# Patient Record
Sex: MALE | Race: WHITE | URBAN - METROPOLITAN AREA
[De-identification: names, ages, dates, MRNs, and addresses within clinical notes are randomized per-mention and may not be internally consistent; named-entity substitution may affect disease eponyms.]

---

## 2019-07-15 ENCOUNTER — HOSPITAL ENCOUNTER (OUTPATIENT)
Dept: CARDIOLOGY | Facility: HOSPITAL | Age: 64
Discharge: HOME | End: 2019-07-15
Attending: NURSE PRACTITIONER

## 2019-07-26 ENCOUNTER — OFFICE VISIT CONVERTED (OUTPATIENT)
Dept: GASTROENTEROLOGY | Facility: CLINIC | Age: 64
End: 2019-07-26
Attending: PHYSICIAN ASSISTANT

## 2019-09-26 ENCOUNTER — OFFICE VISIT CONVERTED (OUTPATIENT)
Dept: GASTROENTEROLOGY | Facility: CLINIC | Age: 64
End: 2019-09-26
Attending: PHYSICIAN ASSISTANT

## 2019-11-21 ENCOUNTER — HOSPITAL ENCOUNTER (OUTPATIENT)
Dept: GASTROENTEROLOGY | Facility: HOSPITAL | Age: 64
Setting detail: HOSPITAL OUTPATIENT SURGERY
Discharge: HOME | End: 2019-11-21
Attending: INTERNAL MEDICINE

## 2020-02-04 ENCOUNTER — OFFICE VISIT CONVERTED (OUTPATIENT)
Dept: GASTROENTEROLOGY | Facility: CLINIC | Age: 65
End: 2020-02-04
Attending: PHYSICIAN ASSISTANT

## 2020-02-04 ENCOUNTER — CONVERSION ENCOUNTER (OUTPATIENT)
Dept: GASTROENTEROLOGY | Facility: CLINIC | Age: 65
End: 2020-02-04

## 2021-05-15 VITALS
WEIGHT: 214 LBS | OXYGEN SATURATION: 98 % | SYSTOLIC BLOOD PRESSURE: 146 MMHG | BODY MASS INDEX: 29.96 KG/M2 | DIASTOLIC BLOOD PRESSURE: 63 MMHG | RESPIRATION RATE: 16 BRPM | HEART RATE: 69 BPM | HEIGHT: 71 IN

## 2021-05-15 VITALS
DIASTOLIC BLOOD PRESSURE: 71 MMHG | BODY MASS INDEX: 29.16 KG/M2 | OXYGEN SATURATION: 97 % | RESPIRATION RATE: 16 BRPM | HEART RATE: 75 BPM | SYSTOLIC BLOOD PRESSURE: 155 MMHG | HEIGHT: 73 IN | WEIGHT: 220 LBS

## 2021-05-15 VITALS
DIASTOLIC BLOOD PRESSURE: 60 MMHG | WEIGHT: 209 LBS | OXYGEN SATURATION: 100 % | HEART RATE: 79 BPM | SYSTOLIC BLOOD PRESSURE: 120 MMHG | RESPIRATION RATE: 16 BRPM

## 2023-04-11 ENCOUNTER — APPOINTMENT (OUTPATIENT)
Dept: CARDIOLOGY | Facility: HOSPITAL | Age: 68
DRG: 603 | End: 2023-04-11
Payer: MEDICARE

## 2023-04-11 ENCOUNTER — HOSPITAL ENCOUNTER (INPATIENT)
Facility: HOSPITAL | Age: 68
LOS: 3 days | Discharge: HOME | DRG: 603 | End: 2023-04-14
Attending: EMERGENCY MEDICINE | Admitting: INTERNAL MEDICINE
Payer: MEDICARE

## 2023-04-11 DIAGNOSIS — L03.116 CELLULITIS OF LEFT LOWER EXTREMITY: Primary | ICD-10-CM

## 2023-04-11 LAB
ALBUMIN SERPL-MCNC: 3.8 G/DL (ref 3.5–5.2)
ALBUMIN/GLOB SERPL: 1.1 G/DL
ALP SERPL-CCNC: 107 U/L (ref 39–117)
ALT SERPL W P-5'-P-CCNC: 32 U/L (ref 1–41)
ANION GAP SERPL CALCULATED.3IONS-SCNC: 12.7 MMOL/L (ref 5–15)
AST SERPL-CCNC: 29 U/L (ref 1–40)
BASOPHILS # BLD AUTO: 0.06 10*3/MM3 (ref 0–0.2)
BASOPHILS NFR BLD AUTO: 0.5 % (ref 0–1.5)
BH CV LOWER VASCULAR LEFT COMMON FEMORAL AUGMENT: NORMAL
BH CV LOWER VASCULAR LEFT COMMON FEMORAL COMPETENT: NORMAL
BH CV LOWER VASCULAR LEFT COMMON FEMORAL COMPRESS: NORMAL
BH CV LOWER VASCULAR LEFT COMMON FEMORAL PHASIC: NORMAL
BH CV LOWER VASCULAR LEFT COMMON FEMORAL SPONT: NORMAL
BH CV LOWER VASCULAR LEFT DISTAL FEMORAL COMPRESS: NORMAL
BH CV LOWER VASCULAR LEFT GASTRONEMIUS COMPRESS: NORMAL
BH CV LOWER VASCULAR LEFT GREATER SAPH AK COMPRESS: NORMAL
BH CV LOWER VASCULAR LEFT GREATER SAPH BK COMPRESS: NORMAL
BH CV LOWER VASCULAR LEFT LESSER SAPH COMPRESS: NORMAL
BH CV LOWER VASCULAR LEFT MID FEMORAL AUGMENT: NORMAL
BH CV LOWER VASCULAR LEFT MID FEMORAL COMPETENT: NORMAL
BH CV LOWER VASCULAR LEFT MID FEMORAL COMPRESS: NORMAL
BH CV LOWER VASCULAR LEFT MID FEMORAL PHASIC: NORMAL
BH CV LOWER VASCULAR LEFT MID FEMORAL SPONT: NORMAL
BH CV LOWER VASCULAR LEFT PERONEAL COMPRESS: NORMAL
BH CV LOWER VASCULAR LEFT POPLITEAL AUGMENT: NORMAL
BH CV LOWER VASCULAR LEFT POPLITEAL COMPETENT: NORMAL
BH CV LOWER VASCULAR LEFT POPLITEAL COMPRESS: NORMAL
BH CV LOWER VASCULAR LEFT POPLITEAL PHASIC: NORMAL
BH CV LOWER VASCULAR LEFT POPLITEAL SPONT: NORMAL
BH CV LOWER VASCULAR LEFT POSTERIOR TIBIAL COMPRESS: NORMAL
BH CV LOWER VASCULAR LEFT PROXIMAL FEMORAL COMPRESS: NORMAL
BH CV LOWER VASCULAR LEFT SAPHENOFEMORAL JUNCTION COMPRESS: NORMAL
BH CV LOWER VASCULAR RIGHT COMMON FEMORAL AUGMENT: NORMAL
BH CV LOWER VASCULAR RIGHT COMMON FEMORAL COMPETENT: NORMAL
BH CV LOWER VASCULAR RIGHT COMMON FEMORAL COMPRESS: NORMAL
BH CV LOWER VASCULAR RIGHT COMMON FEMORAL PHASIC: NORMAL
BH CV LOWER VASCULAR RIGHT COMMON FEMORAL SPONT: NORMAL
BH CV VAS PRELIMINARY FINDINGS SCRIPTING: 1
BILIRUB SERPL-MCNC: 0.7 MG/DL (ref 0–1.2)
BUN SERPL-MCNC: 15 MG/DL (ref 8–23)
BUN/CREAT SERPL: 17 (ref 7–25)
CALCIUM SPEC-SCNC: 9.7 MG/DL (ref 8.6–10.5)
CHLORIDE SERPL-SCNC: 101 MMOL/L (ref 98–107)
CO2 SERPL-SCNC: 24.3 MMOL/L (ref 22–29)
CREAT SERPL-MCNC: 0.88 MG/DL (ref 0.76–1.27)
D-LACTATE SERPL-SCNC: 1.5 MMOL/L (ref 0.5–2)
DEPRECATED RDW RBC AUTO: 42.2 FL (ref 37–54)
EGFRCR SERPLBLD CKD-EPI 2021: 93.7 ML/MIN/1.73
EOSINOPHIL # BLD AUTO: 0.1 10*3/MM3 (ref 0–0.4)
EOSINOPHIL NFR BLD AUTO: 0.9 % (ref 0.3–6.2)
ERYTHROCYTE [DISTWIDTH] IN BLOOD BY AUTOMATED COUNT: 12.7 % (ref 12.3–15.4)
GLOBULIN UR ELPH-MCNC: 3.6 GM/DL
GLUCOSE SERPL-MCNC: 128 MG/DL (ref 65–99)
HCT VFR BLD AUTO: 39.5 % (ref 37.5–51)
HGB BLD-MCNC: 13.6 G/DL (ref 13–17.7)
IMM GRANULOCYTES # BLD AUTO: 0.19 10*3/MM3 (ref 0–0.05)
IMM GRANULOCYTES NFR BLD AUTO: 1.7 % (ref 0–0.5)
LYMPHOCYTES # BLD AUTO: 2.23 10*3/MM3 (ref 0.7–3.1)
LYMPHOCYTES NFR BLD AUTO: 19.9 % (ref 19.6–45.3)
MAXIMAL PREDICTED HEART RATE: 152 BPM
MCH RBC QN AUTO: 31.1 PG (ref 26.6–33)
MCHC RBC AUTO-ENTMCNC: 34.4 G/DL (ref 31.5–35.7)
MCV RBC AUTO: 90.4 FL (ref 79–97)
MONOCYTES # BLD AUTO: 0.82 10*3/MM3 (ref 0.1–0.9)
MONOCYTES NFR BLD AUTO: 7.3 % (ref 5–12)
NEUTROPHILS NFR BLD AUTO: 69.7 % (ref 42.7–76)
NEUTROPHILS NFR BLD AUTO: 7.83 10*3/MM3 (ref 1.7–7)
NRBC BLD AUTO-RTO: 0 /100 WBC (ref 0–0.2)
PLATELET # BLD AUTO: 165 10*3/MM3 (ref 140–450)
PMV BLD AUTO: 12.3 FL (ref 6–12)
POTASSIUM SERPL-SCNC: 3.7 MMOL/L (ref 3.5–5.2)
PROT SERPL-MCNC: 7.4 G/DL (ref 6–8.5)
RBC # BLD AUTO: 4.37 10*6/MM3 (ref 4.14–5.8)
SODIUM SERPL-SCNC: 138 MMOL/L (ref 136–145)
STRESS TARGET HR: 129 BPM
WBC NRBC COR # BLD: 11.23 10*3/MM3 (ref 3.4–10.8)

## 2023-04-11 PROCEDURE — 25010000002 FUROSEMIDE PER 20 MG: Performed by: INTERNAL MEDICINE

## 2023-04-11 PROCEDURE — 93971 EXTREMITY STUDY: CPT

## 2023-04-11 PROCEDURE — 80053 COMPREHEN METABOLIC PANEL: CPT | Performed by: EMERGENCY MEDICINE

## 2023-04-11 PROCEDURE — 25010000002 ENOXAPARIN PER 10 MG: Performed by: INTERNAL MEDICINE

## 2023-04-11 PROCEDURE — 25010000002 VANCOMYCIN 5 G RECONSTITUTED SOLUTION: Performed by: EMERGENCY MEDICINE

## 2023-04-11 PROCEDURE — 87040 BLOOD CULTURE FOR BACTERIA: CPT | Performed by: EMERGENCY MEDICINE

## 2023-04-11 PROCEDURE — 85025 COMPLETE CBC W/AUTO DIFF WBC: CPT | Performed by: EMERGENCY MEDICINE

## 2023-04-11 PROCEDURE — 25010000002 CEFAZOLIN IN DEXTROSE 2-4 GM/100ML-% SOLUTION: Performed by: INTERNAL MEDICINE

## 2023-04-11 PROCEDURE — 93971 EXTREMITY STUDY: CPT | Performed by: SURGERY

## 2023-04-11 PROCEDURE — 83605 ASSAY OF LACTIC ACID: CPT | Performed by: EMERGENCY MEDICINE

## 2023-04-11 PROCEDURE — 99284 EMERGENCY DEPT VISIT MOD MDM: CPT

## 2023-04-11 RX ORDER — TAMSULOSIN HYDROCHLORIDE 0.4 MG/1
1 CAPSULE ORAL DAILY
COMMUNITY
Start: 2023-02-27

## 2023-04-11 RX ORDER — ASPIRIN 81 MG/1
81 TABLET ORAL DAILY
COMMUNITY

## 2023-04-11 RX ORDER — VANCOMYCIN 2 GRAM/500 ML IN 0.9 % SODIUM CHLORIDE INTRAVENOUS
20 ONCE
Status: COMPLETED | OUTPATIENT
Start: 2023-04-11 | End: 2023-04-11

## 2023-04-11 RX ORDER — AMLODIPINE BESYLATE 10 MG/1
10 TABLET ORAL DAILY
COMMUNITY
End: 2023-04-14 | Stop reason: HOSPADM

## 2023-04-11 RX ORDER — ACETAMINOPHEN 325 MG/1
650 TABLET ORAL EVERY 4 HOURS PRN
Status: DISCONTINUED | OUTPATIENT
Start: 2023-04-11 | End: 2023-04-14 | Stop reason: HOSPADM

## 2023-04-11 RX ORDER — FUROSEMIDE 10 MG/ML
20 INJECTION INTRAMUSCULAR; INTRAVENOUS ONCE
Status: COMPLETED | OUTPATIENT
Start: 2023-04-11 | End: 2023-04-11

## 2023-04-11 RX ORDER — SODIUM CHLORIDE 0.9 % (FLUSH) 0.9 %
10 SYRINGE (ML) INJECTION EVERY 12 HOURS SCHEDULED
Status: DISCONTINUED | OUTPATIENT
Start: 2023-04-11 | End: 2023-04-14 | Stop reason: HOSPADM

## 2023-04-11 RX ORDER — ATORVASTATIN CALCIUM 20 MG/1
20 TABLET, FILM COATED ORAL NIGHTLY
COMMUNITY

## 2023-04-11 RX ORDER — DOXYCYCLINE 100 MG/1
TABLET ORAL
COMMUNITY
Start: 2023-04-10 | End: 2023-04-11

## 2023-04-11 RX ORDER — TELMISARTAN 80 MG/1
80 TABLET ORAL DAILY
COMMUNITY
Start: 2023-02-27

## 2023-04-11 RX ORDER — GABAPENTIN 600 MG/1
600 TABLET ORAL NIGHTLY
COMMUNITY

## 2023-04-11 RX ORDER — AMITRIPTYLINE HYDROCHLORIDE 25 MG/1
TABLET, FILM COATED ORAL
COMMUNITY
End: 2023-04-11

## 2023-04-11 RX ORDER — ONDANSETRON 4 MG/1
4 TABLET, FILM COATED ORAL EVERY 6 HOURS PRN
Status: DISCONTINUED | OUTPATIENT
Start: 2023-04-11 | End: 2023-04-14 | Stop reason: HOSPADM

## 2023-04-11 RX ORDER — ENOXAPARIN SODIUM 100 MG/ML
40 INJECTION SUBCUTANEOUS DAILY
Status: DISCONTINUED | OUTPATIENT
Start: 2023-04-11 | End: 2023-04-14 | Stop reason: HOSPADM

## 2023-04-11 RX ORDER — SODIUM CHLORIDE 9 MG/ML
40 INJECTION, SOLUTION INTRAVENOUS AS NEEDED
Status: DISCONTINUED | OUTPATIENT
Start: 2023-04-11 | End: 2023-04-14 | Stop reason: HOSPADM

## 2023-04-11 RX ORDER — CEFAZOLIN SODIUM 2 G/100ML
2 INJECTION, SOLUTION INTRAVENOUS EVERY 8 HOURS
Status: DISCONTINUED | OUTPATIENT
Start: 2023-04-11 | End: 2023-04-14 | Stop reason: HOSPADM

## 2023-04-11 RX ORDER — OMEPRAZOLE 40 MG/1
40 CAPSULE, DELAYED RELEASE ORAL DAILY
COMMUNITY

## 2023-04-11 RX ORDER — SODIUM CHLORIDE 0.9 % (FLUSH) 0.9 %
10 SYRINGE (ML) INJECTION AS NEEDED
Status: DISCONTINUED | OUTPATIENT
Start: 2023-04-11 | End: 2023-04-14 | Stop reason: HOSPADM

## 2023-04-11 RX ADMIN — FUROSEMIDE 20 MG: 10 INJECTION, SOLUTION INTRAMUSCULAR; INTRAVENOUS at 15:23

## 2023-04-11 RX ADMIN — ENOXAPARIN SODIUM 40 MG: 100 INJECTION SUBCUTANEOUS at 16:55

## 2023-04-11 RX ADMIN — VANCOMYCIN HYDROCHLORIDE 2000 MG: 5 INJECTION, POWDER, LYOPHILIZED, FOR SOLUTION INTRAVENOUS at 14:21

## 2023-04-11 RX ADMIN — CEFAZOLIN SODIUM 2 G: 2 INJECTION, SOLUTION INTRAVENOUS at 16:55

## 2023-04-11 NOTE — PLAN OF CARE
Goal Outcome Evaluation:   Redness noted to entire left lower extremity. No complaints per pt.

## 2023-04-11 NOTE — H&P
[facility] HISTORY AND PHYSICAL  Date: 2023   Patient Name: Ermias Fuentes  : 1955  MRN: [id number]  Primary Care Physician:  Talha Valle APRN  Date of admission: 2023    Subjective   Subjective     Chief Complaint: Left leg infection    HPI:    Ermias Fuentes is a 67 y.o. male past medical history BPH and hypertension who presents with left leg redness who failed outpatient therapy for cellulitis    Patient states that he had redness in his foot and leg for about 1 week.  Initially it was just in his foot and now it extends all the way up to his groin.  He had swelling lymph nodes in his groin.  No fevers or chills.  Saw his primary care doctor and was put on doxycycline.  He had no improvement and continued to streak up his leg.  As result he came to ER for further evaluation      In the emergency department the patient's vital signs are as follows: Temperature is 97.8, pulse 70, respirate 20, blood pressure 137/70, 98% on room air.  CBC shows a white blood cell count of 11.  CMP is normal.  Duplex does not show a DVT.  Patient was treated with doxycycline but this infection continued to spread so he came to the ER for further evaluation.  Blood cultures were sent.  Patient was given a dose of vancomycin.  Patient be admitted to hospital for cellulitis that failed outpatient treatment    All systems reviewed abnormal as noted above    Personal History     Past Medical History:  Hypertension  BPH    Past Surgical History:  Knee surgery    Family History:   No family history of skin effect    Social History:   Never smoked.   Social alcohol use    Home Medications:  amLODIPine, amitriptyline, aspirin, atorvastatin, doxycycline, omeprazole, and tamsulosin    Allergies:  No Known Allergies      Objective   Objective     Vitals:   Temp:  [97.8 °F (36.6 °C)] 97.8 °F (36.6 °C)  Heart Rate:  [78-84] 78  Resp:  [20] 20  BP: (137-180)/(70-73) 137/70    Physical  Exam    Constitutional: Awake, alert, no acute distress   Eyes: Pupils equal, sclerae anicteric, no conjunctival injection   HENT: NCAT, mucous membranes moist   Neck: Supple, no thyromegaly, no lymphadenopathy, trachea midline   Respiratory: Clear to auscultation bilaterally, nonlabored respirations    Cardiovascular: RRR, no murmurs, rubs, or gallops, palpable pedal pulses bilaterally   Gastrointestinal: Positive bowel sounds, soft, nontender, nondistended   Musculoskeletal: No bilateral ankle edema, no clubbing or cyanosis to extremities   Psychiatric: Appropriate affect, cooperative   Neurologic: Oriented x 3, strength symmetric in all extremities, Cranial Nerves grossly intact to confrontation, speech clear   Skin: 2+ pitting edema redness and warmth all the way up to the inguinal area.  Swollen lymph nodes    Result Review    Result Review:  I have personally reviewed the results from the time of this admission to 4/11/2023 14:17 EDT and agree with these findings:  White blood cell count is 11.3    Assessment & Plan   Assessment / Plan     Assessment/Plan:   Nonpurulent cellulitis that failed outpatient treatment  Hypertension  BPH    Plan:  -- Admit hospitalist service  -- Status post a dose of vancomycin in the emergency department will change to cefazolin because it appears to be nonpurulent cellulitis  -- 1 dose of Lasix due to swelling and leg  -- Plex was negative for DVT  -- Follow-up blood culture      DVT prophylaxis:  Lovenox    CODE STATUS:     Full code      Admission Status:  I believe this patient meets admission   status.    Electronically signed by Jose Shepherd MD, 04/11/23, 2:17 PM EDT.

## 2023-04-12 ENCOUNTER — TRANSCRIBE ORDERS (OUTPATIENT)
Dept: ADMINISTRATIVE | Facility: HOSPITAL | Age: 68
End: 2023-04-12
Payer: MEDICARE

## 2023-04-12 DIAGNOSIS — L03.116 CELLULITIS OF LEFT LOWER LIMB: Primary | ICD-10-CM

## 2023-04-12 LAB
ALBUMIN SERPL-MCNC: 3.4 G/DL (ref 3.5–5.2)
ALBUMIN/GLOB SERPL: 1.1 G/DL
ALP SERPL-CCNC: 143 U/L (ref 39–117)
ALT SERPL W P-5'-P-CCNC: 32 U/L (ref 1–41)
ANION GAP SERPL CALCULATED.3IONS-SCNC: 12.7 MMOL/L (ref 5–15)
AST SERPL-CCNC: 33 U/L (ref 1–40)
BASOPHILS # BLD AUTO: 0.04 10*3/MM3 (ref 0–0.2)
BASOPHILS NFR BLD AUTO: 0.4 % (ref 0–1.5)
BILIRUB SERPL-MCNC: 0.5 MG/DL (ref 0–1.2)
BUN SERPL-MCNC: 12 MG/DL (ref 8–23)
BUN/CREAT SERPL: 13 (ref 7–25)
CALCIUM SPEC-SCNC: 8.9 MG/DL (ref 8.6–10.5)
CHLORIDE SERPL-SCNC: 100 MMOL/L (ref 98–107)
CO2 SERPL-SCNC: 25.3 MMOL/L (ref 22–29)
CREAT SERPL-MCNC: 0.92 MG/DL (ref 0.76–1.27)
DEPRECATED RDW RBC AUTO: 42.1 FL (ref 37–54)
EGFRCR SERPLBLD CKD-EPI 2021: 90.6 ML/MIN/1.73
EOSINOPHIL # BLD AUTO: 0.14 10*3/MM3 (ref 0–0.4)
EOSINOPHIL NFR BLD AUTO: 1.5 % (ref 0.3–6.2)
ERYTHROCYTE [DISTWIDTH] IN BLOOD BY AUTOMATED COUNT: 12.8 % (ref 12.3–15.4)
GLOBULIN UR ELPH-MCNC: 3.2 GM/DL
GLUCOSE SERPL-MCNC: 105 MG/DL (ref 65–99)
HCT VFR BLD AUTO: 37.7 % (ref 37.5–51)
HGB BLD-MCNC: 13.1 G/DL (ref 13–17.7)
IMM GRANULOCYTES # BLD AUTO: 0.27 10*3/MM3 (ref 0–0.05)
IMM GRANULOCYTES NFR BLD AUTO: 2.8 % (ref 0–0.5)
LYMPHOCYTES # BLD AUTO: 1.9 10*3/MM3 (ref 0.7–3.1)
LYMPHOCYTES NFR BLD AUTO: 20 % (ref 19.6–45.3)
MAGNESIUM SERPL-MCNC: 1.5 MG/DL (ref 1.6–2.4)
MCH RBC QN AUTO: 31.6 PG (ref 26.6–33)
MCHC RBC AUTO-ENTMCNC: 34.7 G/DL (ref 31.5–35.7)
MCV RBC AUTO: 90.8 FL (ref 79–97)
MONOCYTES # BLD AUTO: 1.05 10*3/MM3 (ref 0.1–0.9)
MONOCYTES NFR BLD AUTO: 11.1 % (ref 5–12)
NEUTROPHILS NFR BLD AUTO: 6.09 10*3/MM3 (ref 1.7–7)
NEUTROPHILS NFR BLD AUTO: 64.2 % (ref 42.7–76)
NRBC BLD AUTO-RTO: 0 /100 WBC (ref 0–0.2)
PLATELET # BLD AUTO: 165 10*3/MM3 (ref 140–450)
PMV BLD AUTO: 12.2 FL (ref 6–12)
POTASSIUM SERPL-SCNC: 3.3 MMOL/L (ref 3.5–5.2)
PROT SERPL-MCNC: 6.6 G/DL (ref 6–8.5)
RBC # BLD AUTO: 4.15 10*6/MM3 (ref 4.14–5.8)
SODIUM SERPL-SCNC: 138 MMOL/L (ref 136–145)
WBC NRBC COR # BLD: 9.49 10*3/MM3 (ref 3.4–10.8)

## 2023-04-12 PROCEDURE — 36415 COLL VENOUS BLD VENIPUNCTURE: CPT | Performed by: INTERNAL MEDICINE

## 2023-04-12 PROCEDURE — 83735 ASSAY OF MAGNESIUM: CPT | Performed by: INTERNAL MEDICINE

## 2023-04-12 PROCEDURE — 25010000002 ENOXAPARIN PER 10 MG: Performed by: INTERNAL MEDICINE

## 2023-04-12 PROCEDURE — 25010000002 FUROSEMIDE PER 20 MG: Performed by: STUDENT IN AN ORGANIZED HEALTH CARE EDUCATION/TRAINING PROGRAM

## 2023-04-12 PROCEDURE — 25010000002 MAGNESIUM SULFATE IN D5W 1G/100ML (PREMIX) 1-5 GM/100ML-% SOLUTION: Performed by: STUDENT IN AN ORGANIZED HEALTH CARE EDUCATION/TRAINING PROGRAM

## 2023-04-12 PROCEDURE — 25010000002 CEFAZOLIN IN DEXTROSE 2-4 GM/100ML-% SOLUTION: Performed by: INTERNAL MEDICINE

## 2023-04-12 PROCEDURE — 80053 COMPREHEN METABOLIC PANEL: CPT | Performed by: INTERNAL MEDICINE

## 2023-04-12 PROCEDURE — 85025 COMPLETE CBC W/AUTO DIFF WBC: CPT | Performed by: INTERNAL MEDICINE

## 2023-04-12 RX ORDER — GABAPENTIN 300 MG/1
600 CAPSULE ORAL NIGHTLY
Status: DISCONTINUED | OUTPATIENT
Start: 2023-04-12 | End: 2023-04-14 | Stop reason: HOSPADM

## 2023-04-12 RX ORDER — OXYCODONE AND ACETAMINOPHEN 7.5; 325 MG/1; MG/1
1 TABLET ORAL EVERY 6 HOURS PRN
Status: DISCONTINUED | OUTPATIENT
Start: 2023-04-12 | End: 2023-04-14 | Stop reason: HOSPADM

## 2023-04-12 RX ORDER — POTASSIUM CHLORIDE 750 MG/1
40 CAPSULE, EXTENDED RELEASE ORAL ONCE
Status: COMPLETED | OUTPATIENT
Start: 2023-04-12 | End: 2023-04-12

## 2023-04-12 RX ORDER — ASPIRIN 81 MG/1
81 TABLET ORAL DAILY
Status: DISCONTINUED | OUTPATIENT
Start: 2023-04-12 | End: 2023-04-12

## 2023-04-12 RX ORDER — ATORVASTATIN CALCIUM 20 MG/1
20 TABLET, FILM COATED ORAL NIGHTLY
Status: DISCONTINUED | OUTPATIENT
Start: 2023-04-12 | End: 2023-04-14 | Stop reason: HOSPADM

## 2023-04-12 RX ORDER — PANTOPRAZOLE SODIUM 40 MG/1
40 TABLET, DELAYED RELEASE ORAL
Status: DISCONTINUED | OUTPATIENT
Start: 2023-04-12 | End: 2023-04-14 | Stop reason: HOSPADM

## 2023-04-12 RX ORDER — MAGNESIUM SULFATE 1 G/100ML
1 INJECTION INTRAVENOUS ONCE
Status: COMPLETED | OUTPATIENT
Start: 2023-04-12 | End: 2023-04-12

## 2023-04-12 RX ORDER — ASPIRIN 81 MG/1
81 TABLET ORAL NIGHTLY
Status: DISCONTINUED | OUTPATIENT
Start: 2023-04-12 | End: 2023-04-14 | Stop reason: HOSPADM

## 2023-04-12 RX ORDER — TAMSULOSIN HYDROCHLORIDE 0.4 MG/1
0.4 CAPSULE ORAL DAILY
Status: DISCONTINUED | OUTPATIENT
Start: 2023-04-12 | End: 2023-04-12

## 2023-04-12 RX ORDER — FUROSEMIDE 10 MG/ML
40 INJECTION INTRAMUSCULAR; INTRAVENOUS ONCE
Status: COMPLETED | OUTPATIENT
Start: 2023-04-12 | End: 2023-04-12

## 2023-04-12 RX ORDER — TAMSULOSIN HYDROCHLORIDE 0.4 MG/1
0.4 CAPSULE ORAL NIGHTLY
Status: DISCONTINUED | OUTPATIENT
Start: 2023-04-12 | End: 2023-04-14 | Stop reason: HOSPADM

## 2023-04-12 RX ADMIN — ACETAMINOPHEN 650 MG: 325 TABLET ORAL at 00:14

## 2023-04-12 RX ADMIN — FUROSEMIDE 40 MG: 10 INJECTION, SOLUTION INTRAMUSCULAR; INTRAVENOUS at 17:52

## 2023-04-12 RX ADMIN — CEFAZOLIN SODIUM 2 G: 2 INJECTION, SOLUTION INTRAVENOUS at 19:23

## 2023-04-12 RX ADMIN — ATORVASTATIN CALCIUM 20 MG: 20 TABLET, FILM COATED ORAL at 20:13

## 2023-04-12 RX ADMIN — ENOXAPARIN SODIUM 40 MG: 100 INJECTION SUBCUTANEOUS at 09:10

## 2023-04-12 RX ADMIN — MAGNESIUM SULFATE 1 G: 1 INJECTION INTRAVENOUS at 09:10

## 2023-04-12 RX ADMIN — ASPIRIN 81 MG: 81 TABLET, COATED ORAL at 20:13

## 2023-04-12 RX ADMIN — POTASSIUM CHLORIDE 40 MEQ: 10 CAPSULE, COATED, EXTENDED RELEASE ORAL at 09:10

## 2023-04-12 RX ADMIN — PANTOPRAZOLE SODIUM 40 MG: 40 TABLET, DELAYED RELEASE ORAL at 09:10

## 2023-04-12 RX ADMIN — CEFAZOLIN SODIUM 2 G: 2 INJECTION, SOLUTION INTRAVENOUS at 00:15

## 2023-04-12 RX ADMIN — CEFAZOLIN SODIUM 2 G: 2 INJECTION, SOLUTION INTRAVENOUS at 10:26

## 2023-04-12 RX ADMIN — Medication 10 ML: at 20:13

## 2023-04-12 RX ADMIN — ACETAMINOPHEN 650 MG: 325 TABLET ORAL at 10:27

## 2023-04-12 RX ADMIN — Medication 10 ML: at 09:11

## 2023-04-12 RX ADMIN — ACETAMINOPHEN 650 MG: 325 TABLET ORAL at 17:52

## 2023-04-12 RX ADMIN — Medication 10 ML: at 00:16

## 2023-04-12 RX ADMIN — TAMSULOSIN HYDROCHLORIDE 0.4 MG: 0.4 CAPSULE ORAL at 20:13

## 2023-04-12 RX ADMIN — GABAPENTIN 600 MG: 300 CAPSULE ORAL at 20:13

## 2023-04-12 NOTE — PLAN OF CARE
Goal Outcome Evaluation:  Plan of Care Reviewed With: patient        Progress: no change  Outcome Evaluation: Patient requests having home meds restarted. Reviewed home meds with patient--takes Micardis and Prilosec in AM, and Amlodipine, gabapentin, flomax, aspirin at bedtime.       Patient has had temp of 100.1 at start of shift, improved with tylenol. Patient reports having fever and flu-like symptoms off and on for past week or so, concurrent with appearance of rash on Left leg. Mild c/o pain in left leg.

## 2023-04-12 NOTE — SIGNIFICANT NOTE
Wound Eval / Progress Noted    [facility]     Patient Name: Ermias Fuentes  : 1955  MRN: [id number]  Today's Date: 2023                 Admit Date: 2023    Visit Dx:    ICD-10-CM ICD-9-CM   1. Cellulitis of left lower extremity  L03.116 682.6       Patient Active Problem List   Diagnosis   • Cellulitis of left lower extremity        Past Medical History:   Diagnosis Date   • BPH (benign prostatic hyperplasia)    • Hyperlipidemia    • Hypertension         Past Surgical History:   Procedure Laterality Date   • KNEE SURGERY Left      Wound Check / Follow-up:  Patient seen today for wound consult. Patient presents to hospital with redness, swelling, and warmth of his left leg. Subsequently diagnosed with cellulitis. No open wounds noted upon assessment. Erythema is marked with surgical skin marker. Redness extends from left ankle to left groin and is notably worse to left lower leg. Patient states this has happened before but not to this extent. No weeping or blistering is noted. Recommending to elevate extremity and provide skin care with CHG washes.      Impression: Cellulitis of left lower extremity.      Short term goals:  Maintain skin integrity, skin care.      Frances Zuñiga RN    2023    10:38 EDT

## 2023-04-12 NOTE — PROGRESS NOTES
" [facility]   Hospitalist Progress Note  Date: 2023  Patient Name: Ermias Fuentes  : 1955  MRN: [id number]  Date of admission: 2023      Subjective   Subjective     Chief Complaint: Left leg infection    Summary: 67-year-old male with history of BPH and hypertension who presented with worsening left lower extremity cellulitis.  Patient had been previously placed on doxycycline as outpatient however failed treatment.  Patient has been admitted for intravenous antibiotics and further management of his left lower extremity cellulitis.    Interval Followup: Patient still complaining of some left lower extremity discomfort.  He states that he would like pain medication, but does not want to take it because he is afraid of becoming \" sick to his stomach.\"  I discussed with the patient that he has nausea medication if needed.  He has not any fevers or chills.  Potassium was low this morning subsequently replenished.    Review of Systems  All systems reviewed and are negative except as above in HPI.    Objective   Objective     Vitals:   Temp:  [97.8 °F (36.6 °C)-100.1 °F (37.8 °C)] 98.3 °F (36.8 °C)  Heart Rate:  [76-96] 87  Resp:  [18-20] 18  BP: (119-180)/(42-73) 153/66  Physical Exam   Constitutional: Alert, awake, in distress due to left lower extremity pain  HEENT:  PERRLA, EOMI; No Scleral icterus  Neck:  Supple; No Mass, No Lymphadenopathy  Cardiovascular:  No Rubs, No Edema, No JVD  Respiratory: No respiratory distress, unlabored breathing, saturating well on room air  Abdomen:  Normal BS all 4 Quadrants; No Guarding, No Tenderness  Musculoskeletal:  Pulses Positive all 4 Ext; left lower extremity with 2+ edema, extensive erythema and edema, tender to palpation  Neurological:  Alert+Ox3, Mental status WNL; No Dysarthria    Result Review    Result Review:  I have personally reviewed the results from the time of this admission to 2023 07:26 EDT and agree with these findings:  []  " Laboratory  []  Microbiology  []  Radiology  []  EKG/Telemetry   []  Cardiology/Vascular   []  Pathology  []  Old records  []  Other:    Assessment & Plan   Assessment / Plan     Assessment:  Nonpurulent cellulitis of left lower extremity, failed outpatient treatment  Since hypertension  BPH  Hypokalemia   Hypomagnesemia    Plan:  Patient to be admitted and continuously monitored for left lower extremity cellulitis  Continue with cefazolin IV 2 g every 8 hours, await results of blood cultures  Patient complaining of pain, will add Percocet as needed  Will give dose of 40 mg Lasix IV for diuresis, monitor I's and O's, daily weights  Continue to monitor CMP and renal function while patient receiving diuretics as he is at risk for nephrotoxicity  Continue tamsulosin given history of BPH  Restart patient's home gabapentin  Continue DVT prophylaxis with Lovenox 40 mg daily  Replenish potassium and magnesium, continue to monitor  CBC, CMP, exam, phosphorus reviewed, recheck in follow-up in a.m.  Wound care assistance appreciated, continue to monitor  Zofran as needed for nausea  Further recommendations pending clinical course      Discussed plan with RN.    DVT prophylaxis:  Medical DVT prophylaxis orders are present.    CODE STATUS:   Level Of Support Discussed With: Patient  Code Status (Patient has no pulse and is not breathing): CPR (Attempt to Resuscitate)  Medical Interventions (Patient has pulse or is breathing): Full Support        Electronically signed by Logan Caicedo DO, 04/12/23, 7:26 AM EDT.

## 2023-04-13 LAB
ALBUMIN SERPL-MCNC: 3.4 G/DL (ref 3.5–5.2)
ALBUMIN/GLOB SERPL: 1 G/DL
ALP SERPL-CCNC: 111 U/L (ref 39–117)
ALT SERPL W P-5'-P-CCNC: 31 U/L (ref 1–41)
ANION GAP SERPL CALCULATED.3IONS-SCNC: 10.8 MMOL/L (ref 5–15)
AST SERPL-CCNC: 30 U/L (ref 1–40)
BASOPHILS # BLD AUTO: 0.05 10*3/MM3 (ref 0–0.2)
BASOPHILS NFR BLD AUTO: 0.5 % (ref 0–1.5)
BILIRUB SERPL-MCNC: 0.5 MG/DL (ref 0–1.2)
BUN SERPL-MCNC: 11 MG/DL (ref 8–23)
BUN/CREAT SERPL: 11.3 (ref 7–25)
CALCIUM SPEC-SCNC: 9.2 MG/DL (ref 8.6–10.5)
CHLORIDE SERPL-SCNC: 99 MMOL/L (ref 98–107)
CO2 SERPL-SCNC: 26.2 MMOL/L (ref 22–29)
CREAT SERPL-MCNC: 0.97 MG/DL (ref 0.76–1.27)
DEPRECATED RDW RBC AUTO: 42.1 FL (ref 37–54)
EGFRCR SERPLBLD CKD-EPI 2021: 85 ML/MIN/1.73
EOSINOPHIL # BLD AUTO: 0.17 10*3/MM3 (ref 0–0.4)
EOSINOPHIL NFR BLD AUTO: 1.7 % (ref 0.3–6.2)
ERYTHROCYTE [DISTWIDTH] IN BLOOD BY AUTOMATED COUNT: 12.7 % (ref 12.3–15.4)
GLOBULIN UR ELPH-MCNC: 3.3 GM/DL
GLUCOSE SERPL-MCNC: 119 MG/DL (ref 65–99)
HCT VFR BLD AUTO: 38.6 % (ref 37.5–51)
HGB BLD-MCNC: 13.1 G/DL (ref 13–17.7)
IMM GRANULOCYTES # BLD AUTO: 0.43 10*3/MM3 (ref 0–0.05)
IMM GRANULOCYTES NFR BLD AUTO: 4.3 % (ref 0–0.5)
LYMPHOCYTES # BLD AUTO: 2.25 10*3/MM3 (ref 0.7–3.1)
LYMPHOCYTES NFR BLD AUTO: 22.6 % (ref 19.6–45.3)
MAGNESIUM SERPL-MCNC: 1.8 MG/DL (ref 1.6–2.4)
MCH RBC QN AUTO: 30.6 PG (ref 26.6–33)
MCHC RBC AUTO-ENTMCNC: 33.9 G/DL (ref 31.5–35.7)
MCV RBC AUTO: 90.2 FL (ref 79–97)
MONOCYTES # BLD AUTO: 1.19 10*3/MM3 (ref 0.1–0.9)
MONOCYTES NFR BLD AUTO: 12 % (ref 5–12)
NEUTROPHILS NFR BLD AUTO: 5.85 10*3/MM3 (ref 1.7–7)
NEUTROPHILS NFR BLD AUTO: 58.9 % (ref 42.7–76)
NRBC BLD AUTO-RTO: 0 /100 WBC (ref 0–0.2)
PHOSPHATE SERPL-MCNC: 4 MG/DL (ref 2.5–4.5)
PLATELET # BLD AUTO: 176 10*3/MM3 (ref 140–450)
PMV BLD AUTO: 12 FL (ref 6–12)
POTASSIUM SERPL-SCNC: 3.4 MMOL/L (ref 3.5–5.2)
PROT SERPL-MCNC: 6.7 G/DL (ref 6–8.5)
RBC # BLD AUTO: 4.28 10*6/MM3 (ref 4.14–5.8)
SODIUM SERPL-SCNC: 136 MMOL/L (ref 136–145)
WBC NRBC COR # BLD: 9.94 10*3/MM3 (ref 3.4–10.8)

## 2023-04-13 PROCEDURE — 80053 COMPREHEN METABOLIC PANEL: CPT | Performed by: STUDENT IN AN ORGANIZED HEALTH CARE EDUCATION/TRAINING PROGRAM

## 2023-04-13 PROCEDURE — 25010000002 ENOXAPARIN PER 10 MG: Performed by: INTERNAL MEDICINE

## 2023-04-13 PROCEDURE — 25010000002 FUROSEMIDE PER 20 MG: Performed by: STUDENT IN AN ORGANIZED HEALTH CARE EDUCATION/TRAINING PROGRAM

## 2023-04-13 PROCEDURE — 84100 ASSAY OF PHOSPHORUS: CPT | Performed by: STUDENT IN AN ORGANIZED HEALTH CARE EDUCATION/TRAINING PROGRAM

## 2023-04-13 PROCEDURE — 83735 ASSAY OF MAGNESIUM: CPT | Performed by: STUDENT IN AN ORGANIZED HEALTH CARE EDUCATION/TRAINING PROGRAM

## 2023-04-13 PROCEDURE — 25010000002 CEFAZOLIN IN DEXTROSE 2-4 GM/100ML-% SOLUTION: Performed by: INTERNAL MEDICINE

## 2023-04-13 PROCEDURE — 85025 COMPLETE CBC W/AUTO DIFF WBC: CPT | Performed by: STUDENT IN AN ORGANIZED HEALTH CARE EDUCATION/TRAINING PROGRAM

## 2023-04-13 RX ORDER — GUAIFENESIN 600 MG/1
600 TABLET, EXTENDED RELEASE ORAL EVERY 12 HOURS SCHEDULED
Status: DISCONTINUED | OUTPATIENT
Start: 2023-04-13 | End: 2023-04-14 | Stop reason: HOSPADM

## 2023-04-13 RX ORDER — FUROSEMIDE 10 MG/ML
40 INJECTION INTRAMUSCULAR; INTRAVENOUS ONCE
Status: COMPLETED | OUTPATIENT
Start: 2023-04-13 | End: 2023-04-13

## 2023-04-13 RX ORDER — POTASSIUM CHLORIDE 750 MG/1
40 CAPSULE, EXTENDED RELEASE ORAL ONCE
Status: COMPLETED | OUTPATIENT
Start: 2023-04-13 | End: 2023-04-13

## 2023-04-13 RX ADMIN — CEFAZOLIN SODIUM 2 G: 2 INJECTION, SOLUTION INTRAVENOUS at 00:18

## 2023-04-13 RX ADMIN — Medication 10 ML: at 21:30

## 2023-04-13 RX ADMIN — POTASSIUM CHLORIDE 40 MEQ: 10 CAPSULE, COATED, EXTENDED RELEASE ORAL at 08:56

## 2023-04-13 RX ADMIN — ACETAMINOPHEN 650 MG: 325 TABLET ORAL at 16:07

## 2023-04-13 RX ADMIN — CEFAZOLIN SODIUM 2 G: 2 INJECTION, SOLUTION INTRAVENOUS at 08:38

## 2023-04-13 RX ADMIN — ACETAMINOPHEN 650 MG: 325 TABLET ORAL at 21:32

## 2023-04-13 RX ADMIN — PANTOPRAZOLE SODIUM 40 MG: 40 TABLET, DELAYED RELEASE ORAL at 05:01

## 2023-04-13 RX ADMIN — TAMSULOSIN HYDROCHLORIDE 0.4 MG: 0.4 CAPSULE ORAL at 21:29

## 2023-04-13 RX ADMIN — Medication 10 ML: at 08:38

## 2023-04-13 RX ADMIN — ASPIRIN 81 MG: 81 TABLET, COATED ORAL at 21:29

## 2023-04-13 RX ADMIN — ENOXAPARIN SODIUM 40 MG: 100 INJECTION SUBCUTANEOUS at 08:38

## 2023-04-13 RX ADMIN — CEFAZOLIN SODIUM 2 G: 2 INJECTION, SOLUTION INTRAVENOUS at 15:39

## 2023-04-13 RX ADMIN — ACETAMINOPHEN 650 MG: 325 TABLET ORAL at 08:56

## 2023-04-13 RX ADMIN — GABAPENTIN 600 MG: 300 CAPSULE ORAL at 21:29

## 2023-04-13 RX ADMIN — GUAIFENESIN 600 MG: 600 TABLET ORAL at 16:55

## 2023-04-13 RX ADMIN — ATORVASTATIN CALCIUM 20 MG: 20 TABLET, FILM COATED ORAL at 21:29

## 2023-04-13 RX ADMIN — FUROSEMIDE 40 MG: 10 INJECTION, SOLUTION INTRAMUSCULAR; INTRAVENOUS at 10:02

## 2023-04-13 NOTE — PLAN OF CARE
Goal Outcome Evaluation:  Plan of Care Reviewed With: patient        Progress: no change  Outcome Evaluation: Patient reports feeling and sleeping much better since having gabapentin restarted. VSS, with elevated temp. Pain controlled. Pt reports leg feels less painful and less tight, with slight visible decrease in edema.

## 2023-04-13 NOTE — PROGRESS NOTES
[facility]   Hospitalist Progress Note  Date: 2023  Patient Name: Ermias Fuentes  : 1955  MRN: [id number]  Date of admission: 2023      Subjective   Subjective     Chief Complaint: Left leg infection    Summary: 67-year-old male with history of BPH and hypertension who presented with worsening left lower extremity cellulitis.  Patient had been previously placed on doxycycline as outpatient however failed treatment.  Patient has been admitted for intravenous antibiotics and further management of his left lower extremity cellulitis.    Interval Followup: No events overnight.  Patient erythema has improved.  He states that he is not having any current leg pain.  His edema is also improving and he is diuresing well.  He did have elevated temperature reading overnight.  He denies any current chest pain or chest pressure.    Review of Systems  All systems reviewed and are negative except as above in HPI.    Objective   Objective     Vitals:   Temp:  [98.7 °F (37.1 °C)-101 °F (38.3 °C)] 98.8 °F (37.1 °C)  Heart Rate:  [75-83] 75  Resp:  [16-18] 18  BP: (122-144)/(53-67) 123/55  Physical Exam   Constitutional: Alert, awake, laying in bed  HEENT:  PERRLA, EOMI; No Scleral icterus  Neck:  Supple; No Mass, No Lymphadenopathy  Cardiovascular:  No Rubs, No Edema, No JVD  Respiratory: No respiratory distress, unlabored breathing, saturating well on room air  Abdomen:  Normal BS all 4 Quadrants; No Guarding, No Tenderness  Musculoskeletal:  Pulses Positive all 4 Ext; left lower extremity with 1+ edema, extensive erythema and edema  Neurological:  Alert+Ox3, Mental status WNL; No Dysarthria    Result Review    Result Review:  I have personally reviewed the results from the time of this admission to 2023 08:14 EDT and agree with these findings:  []  Laboratory  []  Microbiology  []  Radiology  []  EKG/Telemetry   []  Cardiology/Vascular   []  Pathology  []  Old records  []  Other:    Assessment & Plan    Assessment / Plan     Assessment:  Nonpurulent cellulitis of left lower extremity, failed outpatient treatment  Left lower extremity edema in the setting of cellulitis  Essential hypertension  BPH  Hypokalemia   Hypomagnesemia    Plan:  Patient to be admitted and continuously monitored for left lower extremity cellulitis  Continue with cefazolin IV 2 g every 8 hours, culture data shows no growth to date, will likely have to treat the most common organisms which include staph and strep discharge  Continue with Percocet as needed for pain  Continue to spot dose 40 mg Lasix IV for diuresis, monitor I's and O's, daily weights  Continue to monitor CMP and renal function while patient receiving diuretics as he is at risk for nephrotoxicity  Continue tamsulosin given history of BPH  Continue with gabapentin  Continue DVT prophylaxis with Lovenox 40 mg daily  Replenish potassium, continue to monitor, recheck in a.m.  CBC, CMP, exam, phosphorus reviewed, recheck in follow-up in a.m.  Wound care assistance appreciated, continue to monitor  Zofran as needed for nausea  Further recommendations pending clinical course      Discussed plan with RN.    DVT prophylaxis:  Medical DVT prophylaxis orders are present.    CODE STATUS:   Level Of Support Discussed With: Patient  Code Status (Patient has no pulse and is not breathing): CPR (Attempt to Resuscitate)  Medical Interventions (Patient has pulse or is breathing): Full Support        Electronically signed by Logan Caicedo DO, 04/13/23, 8:14 AM EDT.

## 2023-04-13 NOTE — PLAN OF CARE
Problem: Adult Inpatient Plan of Care  Goal: Plan of Care Review  Outcome: Ongoing, Progressing  Flowsheets (Taken 4/13/2023 1440)  Progress: no change  Plan of Care Reviewed With: patient  Outcome Evaluation: VSS. PT HAS BEEN RESTING THROUGHOUT SHIFT. CLEANED LEFT LEG AS ORDERED. EDUCATED PT ABOUT ELEVATING LEG. NO NEW CHANGES AT THIS TIME.  Goal: Patient-Specific Goal (Individualized)  Outcome: Ongoing, Progressing  Goal: Absence of Hospital-Acquired Illness or Injury  Outcome: Ongoing, Progressing  Intervention: Identify and Manage Fall Risk  Recent Flowsheet Documentation  Taken 4/13/2023 1350 by Esequiel Ames, RN  Safety Promotion/Fall Prevention: safety round/check completed  Taken 4/13/2023 1123 by Esequiel Ames RN  Safety Promotion/Fall Prevention: safety round/check completed  Taken 4/13/2023 0902 by Esequiel Ames, RN  Safety Promotion/Fall Prevention: safety round/check completed  Taken 4/13/2023 0900 by Esequiel Ames RN  Safety Promotion/Fall Prevention: safety round/check completed  Taken 4/13/2023 0758 by Esequiel Ames RN  Safety Promotion/Fall Prevention: safety round/check completed  Goal: Optimal Comfort and Wellbeing  Outcome: Ongoing, Progressing  Goal: Readiness for Transition of Care  Outcome: Ongoing, Progressing     Problem: Hypertension Comorbidity  Goal: Blood Pressure in Desired Range  Outcome: Ongoing, Progressing     Problem: Skin or Soft Tissue Infection  Goal: Absence of Infection Signs and Symptoms  Outcome: Ongoing, Progressing   Goal Outcome Evaluation:  Plan of Care Reviewed With: patient        Progress: no change  Outcome Evaluation: VSS. PT HAS BEEN RESTING THROUGHOUT SHIFT. CLEANED LEFT LEG AS ORDERED. EDUCATED PT ABOUT ELEVATING LEG. NO NEW CHANGES AT THIS TIME.

## 2023-04-14 ENCOUNTER — READMISSION MANAGEMENT (OUTPATIENT)
Dept: CALL CENTER | Facility: HOSPITAL | Age: 68
End: 2023-04-14
Payer: MEDICARE

## 2023-04-14 VITALS
DIASTOLIC BLOOD PRESSURE: 54 MMHG | HEART RATE: 72 BPM | TEMPERATURE: 97.9 F | BODY MASS INDEX: 30.46 KG/M2 | RESPIRATION RATE: 18 BRPM | SYSTOLIC BLOOD PRESSURE: 133 MMHG | OXYGEN SATURATION: 94 % | HEIGHT: 71 IN | WEIGHT: 217.59 LBS

## 2023-04-14 LAB
ALBUMIN SERPL-MCNC: 3.4 G/DL (ref 3.5–5.2)
ALBUMIN/GLOB SERPL: 0.9 G/DL
ALP SERPL-CCNC: 97 U/L (ref 39–117)
ALT SERPL W P-5'-P-CCNC: 23 U/L (ref 1–41)
ANION GAP SERPL CALCULATED.3IONS-SCNC: 11.7 MMOL/L (ref 5–15)
AST SERPL-CCNC: 24 U/L (ref 1–40)
BASOPHILS # BLD AUTO: 0.06 10*3/MM3 (ref 0–0.2)
BASOPHILS NFR BLD AUTO: 0.5 % (ref 0–1.5)
BILIRUB SERPL-MCNC: 0.5 MG/DL (ref 0–1.2)
BUN SERPL-MCNC: 7 MG/DL (ref 8–23)
BUN/CREAT SERPL: 10.1 (ref 7–25)
CALCIUM SPEC-SCNC: 9.5 MG/DL (ref 8.6–10.5)
CHLORIDE SERPL-SCNC: 99 MMOL/L (ref 98–107)
CO2 SERPL-SCNC: 25.3 MMOL/L (ref 22–29)
CREAT SERPL-MCNC: 0.69 MG/DL (ref 0.76–1.27)
DEPRECATED RDW RBC AUTO: 41.8 FL (ref 37–54)
EGFRCR SERPLBLD CKD-EPI 2021: 100.8 ML/MIN/1.73
EOSINOPHIL # BLD AUTO: 0.27 10*3/MM3 (ref 0–0.4)
EOSINOPHIL NFR BLD AUTO: 2.4 % (ref 0.3–6.2)
ERYTHROCYTE [DISTWIDTH] IN BLOOD BY AUTOMATED COUNT: 12.7 % (ref 12.3–15.4)
GIANT PLATELETS: NORMAL
GLOBULIN UR ELPH-MCNC: 3.6 GM/DL
GLUCOSE SERPL-MCNC: 120 MG/DL (ref 65–99)
HCT VFR BLD AUTO: 38.7 % (ref 37.5–51)
HGB BLD-MCNC: 13.3 G/DL (ref 13–17.7)
IMM GRANULOCYTES # BLD AUTO: 0.58 10*3/MM3 (ref 0–0.05)
IMM GRANULOCYTES NFR BLD AUTO: 5.2 % (ref 0–0.5)
LARGE PLATELETS: NORMAL
LYMPHOCYTES # BLD AUTO: 2.52 10*3/MM3 (ref 0.7–3.1)
LYMPHOCYTES NFR BLD AUTO: 22.5 % (ref 19.6–45.3)
MAGNESIUM SERPL-MCNC: 1.7 MG/DL (ref 1.6–2.4)
MCH RBC QN AUTO: 31.2 PG (ref 26.6–33)
MCHC RBC AUTO-ENTMCNC: 34.4 G/DL (ref 31.5–35.7)
MCV RBC AUTO: 90.8 FL (ref 79–97)
MONOCYTES # BLD AUTO: 1.28 10*3/MM3 (ref 0.1–0.9)
MONOCYTES NFR BLD AUTO: 11.4 % (ref 5–12)
NEUTROPHILS NFR BLD AUTO: 58 % (ref 42.7–76)
NEUTROPHILS NFR BLD AUTO: 6.47 10*3/MM3 (ref 1.7–7)
NRBC BLD AUTO-RTO: 0 /100 WBC (ref 0–0.2)
PHOSPHATE SERPL-MCNC: 3.3 MG/DL (ref 2.5–4.5)
PLATELET # BLD AUTO: 192 10*3/MM3 (ref 140–450)
PMV BLD AUTO: 12.3 FL (ref 6–12)
POTASSIUM SERPL-SCNC: 3.3 MMOL/L (ref 3.5–5.2)
PROT SERPL-MCNC: 7 G/DL (ref 6–8.5)
RBC # BLD AUTO: 4.26 10*6/MM3 (ref 4.14–5.8)
RBC MORPH BLD: NORMAL
SODIUM SERPL-SCNC: 136 MMOL/L (ref 136–145)
WBC MORPH BLD: NORMAL
WBC NRBC COR # BLD: 11.18 10*3/MM3 (ref 3.4–10.8)

## 2023-04-14 PROCEDURE — 84100 ASSAY OF PHOSPHORUS: CPT | Performed by: STUDENT IN AN ORGANIZED HEALTH CARE EDUCATION/TRAINING PROGRAM

## 2023-04-14 PROCEDURE — 85025 COMPLETE CBC W/AUTO DIFF WBC: CPT | Performed by: STUDENT IN AN ORGANIZED HEALTH CARE EDUCATION/TRAINING PROGRAM

## 2023-04-14 PROCEDURE — 83735 ASSAY OF MAGNESIUM: CPT | Performed by: STUDENT IN AN ORGANIZED HEALTH CARE EDUCATION/TRAINING PROGRAM

## 2023-04-14 PROCEDURE — 25010000002 MAGNESIUM SULFATE IN D5W 1G/100ML (PREMIX) 1-5 GM/100ML-% SOLUTION: Performed by: STUDENT IN AN ORGANIZED HEALTH CARE EDUCATION/TRAINING PROGRAM

## 2023-04-14 PROCEDURE — 80053 COMPREHEN METABOLIC PANEL: CPT | Performed by: STUDENT IN AN ORGANIZED HEALTH CARE EDUCATION/TRAINING PROGRAM

## 2023-04-14 PROCEDURE — 25010000002 ENOXAPARIN PER 10 MG: Performed by: INTERNAL MEDICINE

## 2023-04-14 PROCEDURE — 25010000002 CEFAZOLIN IN DEXTROSE 2-4 GM/100ML-% SOLUTION: Performed by: INTERNAL MEDICINE

## 2023-04-14 PROCEDURE — 85007 BL SMEAR W/DIFF WBC COUNT: CPT | Performed by: STUDENT IN AN ORGANIZED HEALTH CARE EDUCATION/TRAINING PROGRAM

## 2023-04-14 RX ORDER — GUAIFENESIN 600 MG/1
600 TABLET, EXTENDED RELEASE ORAL EVERY 12 HOURS SCHEDULED
Qty: 12 TABLET | Refills: 0 | Status: SHIPPED | OUTPATIENT
Start: 2023-04-14 | End: 2023-04-20

## 2023-04-14 RX ORDER — SACCHAROMYCES BOULARDII 250 MG
500 CAPSULE ORAL DAILY
Status: DISCONTINUED | OUTPATIENT
Start: 2023-04-14 | End: 2023-04-14 | Stop reason: HOSPADM

## 2023-04-14 RX ORDER — CEPHALEXIN 500 MG/1
500 CAPSULE ORAL 4 TIMES DAILY
Qty: 32 CAPSULE | Refills: 0 | Status: SHIPPED | OUTPATIENT
Start: 2023-04-14 | End: 2023-04-22

## 2023-04-14 RX ORDER — MAGNESIUM SULFATE 1 G/100ML
1 INJECTION INTRAVENOUS ONCE
Status: COMPLETED | OUTPATIENT
Start: 2023-04-14 | End: 2023-04-14

## 2023-04-14 RX ORDER — SACCHAROMYCES BOULARDII 250 MG
500 CAPSULE ORAL DAILY
Qty: 20 CAPSULE | Refills: 0 | Status: SHIPPED | OUTPATIENT
Start: 2023-04-15 | End: 2023-04-25

## 2023-04-14 RX ORDER — POTASSIUM CHLORIDE 750 MG/1
40 CAPSULE, EXTENDED RELEASE ORAL ONCE
Status: COMPLETED | OUTPATIENT
Start: 2023-04-14 | End: 2023-04-14

## 2023-04-14 RX ADMIN — Medication 500 MG: at 09:17

## 2023-04-14 RX ADMIN — PANTOPRAZOLE SODIUM 40 MG: 40 TABLET, DELAYED RELEASE ORAL at 05:10

## 2023-04-14 RX ADMIN — CEFAZOLIN SODIUM 2 G: 2 INJECTION, SOLUTION INTRAVENOUS at 07:55

## 2023-04-14 RX ADMIN — POTASSIUM CHLORIDE 40 MEQ: 10 CAPSULE, COATED, EXTENDED RELEASE ORAL at 09:17

## 2023-04-14 RX ADMIN — CEFAZOLIN SODIUM 2 G: 2 INJECTION, SOLUTION INTRAVENOUS at 02:32

## 2023-04-14 RX ADMIN — Medication 10 ML: at 09:18

## 2023-04-14 RX ADMIN — ACETAMINOPHEN 650 MG: 325 TABLET ORAL at 09:17

## 2023-04-14 RX ADMIN — GUAIFENESIN 600 MG: 600 TABLET ORAL at 09:17

## 2023-04-14 RX ADMIN — MAGNESIUM SULFATE 1 G: 1 INJECTION INTRAVENOUS at 09:16

## 2023-04-14 RX ADMIN — ENOXAPARIN SODIUM 40 MG: 100 INJECTION SUBCUTANEOUS at 09:17

## 2023-04-14 NOTE — OUTREACH NOTE
Prep Survey    Flowsheet Row Responses   [facility] facility patient discharged from? [facility]   Is LACE score < 7 ? Yes   Eligibility Not Eligible   What are the reasons patient is not eligible? Other  [Low risk for readmission]   Does the patient have one of the following disease processes/diagnoses(primary or secondary)? Other   Prep survey completed? Yes          Cinthya VALDEZ - Registered Nurse

## 2023-04-14 NOTE — DISCHARGE SUMMARY
[facility]         HOSPITALIST  DISCHARGE SUMMARY    Patient Name: Ermias Fuentes  : 1955  MRN: [id number]    Date of Admission: 2023  Date of Discharge: 2023  Primary Care Physician: Talha Valle APRN    Consults     No orders found from 3/13/2023 to 2023.          Active and Resolved Hospital Problems:  Active Hospital Problems    Diagnosis POA   • **Cellulitis of left lower extremity [L03.116] Yes      Resolved Hospital Problems   No resolved problems to display.       Hospital Course     Hospital Course:  Ermias Fuentes is a 67 y.o. male who presented as an outpatient after failing outpatient therapy with doxycycline.  Prior to initial presentation patient had been placed on doxycycline for 1 week without any improvement.  Patient ultimately admitted and placed on intravenous cephalexin.  Upon talking with the patient he stated that he had not had his leg elevated at home while taking antibiotic therapy.  I did  the patient that elevating his extremity in order to alleviate edema was a highly important component of his treatment plan.  Patient also received intermittent Lasix while admitted due to lower extremity edema.  His erythema and edema improved and his white blood cell count was stable at the time of discharge.  Patient discharged stable condition.  He will follow-up with PCP 1 week.    Discharge problem list:  Nonpurulent cellulitis of left lower extremity, failed outpatient treatment  Left lower extremity edema in the setting of cellulitis  Essential hypertension  BPH  Hypokalemia   Hypomagnesemia          DISCHARGE Follow Up Recommendations for labs and diagnostics: PCP 1 week.      Day of Discharge     Vital Signs:  Temp:  [97.9 °F (36.6 °C)-99 °F (37.2 °C)] 97.9 °F (36.6 °C)  Heart Rate:  [67-79] 72  Resp:  [18] 18  BP: (133-149)/(54-74) 133/54  Physical Exam:   Constitutional: Alert, awake, no acute distress  HEENT:  PERRLA, EOMI; No Scleral  icterus  Neck:  Supple; No Mass, No Lymphadenopathy  Cardiovascular:  No Rubs, No Edema, No JVD  Respiratory: No respiratory distress, unlabored breathing, saturating well on room air  Abdomen:  Normal BS all 4 Quadrants; No Guarding, No Tenderness  Musculoskeletal: Edema of the left lower extremity improving, erythema improving  Neurological:  Alert+Ox3, Mental status WNL; No Dysarthria  Skin:  No Rash, No Cellulitis, No Erythema      Discharge Details        Discharge Medications      New Medications      Instructions Start Date   cephalexin 500 MG capsule  Commonly known as: Keflex   500 mg, Oral, 4 Times Daily      guaiFENesin 600 MG 12 hr tablet  Commonly known as: MUCINEX   600 mg, Oral, Every 12 Hours Scheduled      saccharomyces boulardii 250 MG capsule  Commonly known as: FLORASTOR   500 mg, Oral, Daily   Start Date: April 15, 2023        Continue These Medications      Instructions Start Date   aspirin 81 MG EC tablet   Take 1 tablet by mouth Daily.      atorvastatin 20 MG tablet  Commonly known as: LIPITOR   Take 1 tablet by mouth Every Night.      gabapentin 600 MG tablet  Commonly known as: NEURONTIN   600 mg, Oral, Nightly      omeprazole 40 MG capsule  Commonly known as: priLOSEC   Take 1 capsule by mouth Daily.      tamsulosin 0.4 MG capsule 24 hr capsule  Commonly known as: FLOMAX   1 capsule, Oral, Daily      telmisartan 80 MG tablet  Commonly known as: MICARDIS   80 mg, Oral, Daily         Stop These Medications    amLODIPine 10 MG tablet  Commonly known as: NORVASC            No Known Allergies    Discharge Disposition:  Home or Self Care    Diet:  Hospital:No active diet order      Discharge Activity:       CODE STATUS:  Code Status and Medical Interventions:   Ordered at: 04/11/23 7331     Level Of Support Discussed With:    Patient     Code Status (Patient has no pulse and is not breathing):    CPR (Attempt to Resuscitate)     Medical Interventions (Patient has pulse or is breathing):    Full  Support         No future appointments.    [unfilled]    Pertinent  and/or Most Recent Results     PROCEDURES:   Duplex Venous Lower Extremity - Left CAR    Result Date: 4/11/2023  Narrative: •  Normal left lower extremity venous duplex scan. •  Left inguinal lymphadenopathy.        LAB RESULTS:      Lab 04/14/23  0511 04/13/23 0459 04/12/23 0518 04/11/23  1232   WBC 11.18* 9.94 9.49 11.23*   HEMOGLOBIN 13.3 13.1 13.1 13.6   HEMATOCRIT 38.7 38.6 37.7 39.5   PLATELETS 192 176 165 165   NEUTROS ABS 6.47 5.85 6.09 7.83*   IMMATURE GRANS (ABS) 0.58* 0.43* 0.27* 0.19*   LYMPHS ABS 2.52 2.25 1.90 2.23   MONOS ABS 1.28* 1.19* 1.05* 0.82   EOS ABS 0.27 0.17 0.14 0.10   MCV 90.8 90.2 90.8 90.4   LACTATE  --   --   --  1.5         Lab 04/14/23  0511 04/13/23 0459 04/12/23  0518 04/11/23  1232   SODIUM 136 136 138 138   POTASSIUM 3.3* 3.4* 3.3* 3.7   CHLORIDE 99 99 100 101   CO2 25.3 26.2 25.3 24.3   ANION GAP 11.7 10.8 12.7 12.7   BUN 7* 11 12 15   CREATININE 0.69* 0.97 0.92 0.88   EGFR 100.8 85.0 90.6 93.7   GLUCOSE 120* 119* 105* 128*   CALCIUM 9.5 9.2 8.9 9.7   MAGNESIUM 1.7 1.8 1.5*  --    PHOSPHORUS 3.3 4.0  --   --          Lab 04/14/23  0511 04/13/23 0459 04/12/23 0518 04/11/23  1232   TOTAL PROTEIN 7.0 6.7 6.6 7.4   ALBUMIN 3.4* 3.4* 3.4* 3.8   GLOBULIN 3.6 3.3 3.2 3.6   ALT (SGPT) 23 31 32 32   AST (SGOT) 24 30 33 29   BILIRUBIN 0.5 0.5 0.5 0.7   ALK PHOS 97 111 143* 107                     Brief Urine Lab Results     None        Microbiology Results (last 10 days)     Procedure Component Value - Date/Time    Blood Culture - Blood, Arm, Left [[id number]]  (Normal) Collected: 04/11/23 1232    Lab Status: Preliminary result Specimen: Blood from Arm, Left Updated: 04/14/23 1600     Blood Culture No growth at 3 days    Blood Culture - Blood, Arm, Left [[id number]]  (Normal) Collected: 04/11/23 1232    Lab Status: Preliminary result Specimen: Blood from Arm, Left Updated: 04/14/23 1245     Blood Culture No growth at  3 days               Results for orders placed during the hospital encounter of 04/11/23    Duplex Venous Lower Extremity - Left CAR    Interpretation Summary  •  Normal left lower extremity venous duplex scan.  •  Left inguinal lymphadenopathy.      Results for orders placed during the hospital encounter of 04/11/23    Duplex Venous Lower Extremity - Left CAR    Interpretation Summary  •  Normal left lower extremity venous duplex scan.  •  Left inguinal lymphadenopathy.          Labs Pending at Discharge:  Pending Labs     Order Current Status    Blood Culture - Blood, Arm, Left Preliminary result    Blood Culture - Blood, Arm, Left Preliminary result            Time spent on Discharge including face to face service:  32 minutes    Electronically signed by Logan Caicedo DO, 04/14/23, 4:54 PM EDT.

## 2023-04-14 NOTE — DISCHARGE INSTRUCTIONS
Left leg:   Please keep elevated when sitting or laying.   Please report any weeping or increase swelling or temperature to MD.   Please take antibiotics as ordered.   Clean leg with soap and water daily.

## 2023-04-14 NOTE — PLAN OF CARE
Goal Outcome Evaluation:              Outcome Evaluation: VSS.  Pt resting throughout shift.  No new changes at this time.

## 2023-04-14 NOTE — PLAN OF CARE
Problem: Adult Inpatient Plan of Care  Goal: Plan of Care Review  Outcome: Met  Goal: Patient-Specific Goal (Individualized)  Outcome: Met  Goal: Absence of Hospital-Acquired Illness or Injury  Outcome: Met  Intervention: Identify and Manage Fall Risk  Recent Flowsheet Documentation  Taken 4/14/2023 1141 by Esequiel Ames RN  Safety Promotion/Fall Prevention: safety round/check completed  Taken 4/14/2023 0917 by Esequiel Ames RN  Safety Promotion/Fall Prevention: safety round/check completed  Taken 4/14/2023 0753 by Esequiel Ames RN  Safety Promotion/Fall Prevention: safety round/check completed  Intervention: Prevent and Manage VTE (Venous Thromboembolism) Risk  Recent Flowsheet Documentation  Taken 4/14/2023 0753 by Esequiel Ames RN  Activity Management:   up ad leticia   ambulated to bathroom   back to bed  Goal: Optimal Comfort and Wellbeing  Outcome: Met  Intervention: Monitor Pain and Promote Comfort  Recent Flowsheet Documentation  Taken 4/14/2023 0917 by Esequiel Ames RN  Pain Management Interventions: see MAR  Goal: Readiness for Transition of Care  Outcome: Met     Problem: Hypertension Comorbidity  Goal: Blood Pressure in Desired Range  Outcome: Met     Problem: Skin or Soft Tissue Infection  Goal: Absence of Infection Signs and Symptoms  Outcome: Met   Goal Outcome Evaluation:  Plan of Care Reviewed With: patient        Progress: no change  Outcome Evaluation: VSS. PT HAS BEEN RESTING THROUGHOUT SHIFT. CLEANED LEFT LEG AS ORDERED. EDUCATED PT ABOUT ELEVATING LEG. NO NEW CHANGES AT THIS TIME.

## 2023-04-16 LAB
BACTERIA SPEC AEROBE CULT: NORMAL
BACTERIA SPEC AEROBE CULT: NORMAL

## 2023-05-05 NOTE — PROGRESS NOTES
[facility]  Cardiology progress Note    Patient Name: Ermias Fuentes  : 1955    CHIEF COMPLAINT  Left lower extremity swelling and redness        Subjective   Subjective     HISTORY OF PRESENT ILLNESS    Ermias Fuentes is a 67 y.o. male with history of left lower extremities redness and swelling which has been persistent for the last 1 month.  He was admitted to the hospital and treated with antibiotics for cellulitis.  Has improved but has still has persistent swelling and cellulitis.  Cardiology consult was referred to rule out CHF.  Denies any shortness of breath.  No chest pain    REVIEW OF SYSTEMS    Constitutional:    No fever, no weight loss  Skin:     No rash  Otolaryngeal:    No difficulty swallowing  Cardiovascular: See HPI.  Pulmonary:    No cough, no sputum production    Personal History     Social History:    reports that he has never smoked. He has never used smokeless tobacco. He reports current alcohol use. Drug use questions deferred to the physician.    Home Medications:  Current Outpatient Medications on File Prior to Visit   Medication Sig   • aspirin 81 MG EC tablet Take 1 tablet by mouth Daily.   • atorvastatin (LIPITOR) 20 MG tablet Take 1 tablet by mouth Every Night.   • gabapentin (NEURONTIN) 600 MG tablet Take 1 tablet by mouth Every Night.   • omeprazole (priLOSEC) 40 MG capsule Take 1 capsule by mouth Daily.   • tamsulosin (FLOMAX) 0.4 MG capsule 24 hr capsule Take 1 capsule by mouth Daily.   • telmisartan (MICARDIS) 80 MG tablet Take 1 tablet by mouth Daily.     No current facility-administered medications on file prior to visit.       Past Medical History:   Diagnosis Date   • BPH (benign prostatic hyperplasia)    • Hyperlipidemia    • Hypertension        Allergies:  No Known Allergies    Objective    Objective       Vitals:      There is no height or weight on file to calculate BMI.     PHYSICAL EXAM:    General Appearance:   · well developed  · well  nourished  HENT:   · oropharynx moist  · lips not cyanotic  Neck:  · thyroid not enlarged  · supple  Respiratory:  · no respiratory distress  · normal breath sounds  · no rales  Cardiovascular:  · no jugular venous distention  · regular rhythm  · apical impulse normal  · S1 normal, S2 normal  · no S3, no S4   · no murmur  · no rub, no thrill  · carotid pulses normal; no bruit  · pedal pulses normal  · lower extremity edema: 1+ left lower extremity associated with redness.  Skin:   · warm, dry  Psychiatric:  · judgement and insight appropriate  · normal mood and affect        Result Review:  I have personally reviewed the available results from  [x]  Laboratory  [x]  EKG  [x]  Cardiology  [x]  Medications  [x]  Old records  []  Other:     Procedures     Impression/Plan:  1.  Left lower extremity swelling/chronic diastolic CHF: Echocardiogram to evaluate left ventricular systolic function and no significant valve abnormalities and tricuspid regurgitation.  Continue Lasix 40 mg once a day.  Monitor BMP.  2.  Chronic DVT: Venous Doppler to evaluate for DVT.  Vascular consult.  3.  Hyperlipidemia: Current Lipitor 20 mg once a day.  Monitor lipid and hepatic profile.  4.  Essential hypertension controlled: Continue Micardis 80 mg once a day.  Monitor blood pressure regularly.           Senthil Montiel MD   05/05/23   13:22 EDT

## 2023-05-08 ENCOUNTER — OFFICE VISIT (OUTPATIENT)
Dept: CARDIOLOGY | Facility: CLINIC | Age: 68
End: 2023-05-08
Payer: MEDICARE

## 2023-05-08 VITALS
SYSTOLIC BLOOD PRESSURE: 144 MMHG | HEART RATE: 70 BPM | BODY MASS INDEX: 32.76 KG/M2 | WEIGHT: 234 LBS | DIASTOLIC BLOOD PRESSURE: 66 MMHG | HEIGHT: 71 IN

## 2023-05-08 DIAGNOSIS — E78.2 HYPERLIPEMIA, MIXED: ICD-10-CM

## 2023-05-08 DIAGNOSIS — I82.513 CHRONIC EMBOLISM AND THROMBOSIS OF FEMORAL VEIN, BILATERAL: ICD-10-CM

## 2023-05-08 DIAGNOSIS — I50.32 DIASTOLIC CHF, CHRONIC: Primary | ICD-10-CM

## 2023-05-08 DIAGNOSIS — I10 HYPERTENSION, ESSENTIAL: ICD-10-CM

## 2023-05-08 DIAGNOSIS — I82.502 CHRONIC DEEP VEIN THROMBOSIS (DVT) OF LEFT LOWER EXTREMITY, UNSPECIFIED VEIN: ICD-10-CM

## 2023-05-08 RX ORDER — FUROSEMIDE 40 MG/1
40 TABLET ORAL DAILY
COMMUNITY
Start: 2023-04-28

## 2023-05-11 ENCOUNTER — OFFICE VISIT (OUTPATIENT)
Dept: VASCULAR SURGERY | Facility: HOSPITAL | Age: 68
End: 2023-05-11
Payer: MEDICARE

## 2023-05-11 ENCOUNTER — HOSPITAL ENCOUNTER (OUTPATIENT)
Dept: CARDIOLOGY | Facility: HOSPITAL | Age: 68
Discharge: HOME | End: 2023-05-11
Payer: MEDICARE

## 2023-05-11 VITALS
HEART RATE: 62 BPM | OXYGEN SATURATION: 96 % | DIASTOLIC BLOOD PRESSURE: 80 MMHG | SYSTOLIC BLOOD PRESSURE: 154 MMHG | RESPIRATION RATE: 18 BRPM | TEMPERATURE: 98 F

## 2023-05-11 DIAGNOSIS — I82.502 CHRONIC DEEP VEIN THROMBOSIS (DVT) OF LEFT LOWER EXTREMITY, UNSPECIFIED VEIN: ICD-10-CM

## 2023-05-11 DIAGNOSIS — I87.8 VENOUS STASIS: Primary | ICD-10-CM

## 2023-05-11 DIAGNOSIS — I82.513 CHRONIC EMBOLISM AND THROMBOSIS OF FEMORAL VEIN, BILATERAL: ICD-10-CM

## 2023-05-11 LAB

## 2023-05-11 PROCEDURE — G0463 HOSPITAL OUTPT CLINIC VISIT: HCPCS | Performed by: SURGERY

## 2023-05-11 PROCEDURE — 93970 EXTREMITY STUDY: CPT

## 2023-05-11 NOTE — PROGRESS NOTES
[facility]   HISTORY AND PHYSICAL    Patient Name: Ermias Fuentes  : 1955  MRN: [id number]  Primary Care Physician:  Talha Valle APRN      Subjective   Subjective     Chief Complaint: LLE cellulitis prolonged    HPI:    Ermias Fuentes is a 67 y.o. male who initially had flu like symptoms 6 weeks ago with redness from the L groin and down the entire leg.   Initially treated with abx and then admitted for IV abx. Has a 20 pack year smoking history but quit in .  He has palpable L PT pulse and some redness and some itching to below the knee at this point.  The remainder of his leg has improved.  He has not worn compression but has been elevating the leg regularly when not active.  Recently completed course of abx last week.      Review of Systems    Non contributory except for the History of Present Illness    Personal History     Past Medical History:   Diagnosis Date   • BPH (benign prostatic hyperplasia)    • Hyperlipidemia    • Hypertension        Past Surgical History:   Procedure Laterality Date   • KNEE SURGERY Left        Family History: family history is not on file. Otherwise pertinent FHx was reviewed and not pertinent to current issue.    Social History:  reports that he quit smoking about 23 years ago. His smoking use included cigarettes. He started smoking about 50 years ago. He has a 27.00 pack-year smoking history. He has never used smokeless tobacco. He reports current alcohol use of about 8.0 standard drinks per week. He reports that he does not use drugs.    Home Medications:  Current Outpatient Medications on File Prior to Visit   Medication Sig   • aspirin 81 MG EC tablet Take 1 tablet by mouth Daily.   • atorvastatin (LIPITOR) 20 MG tablet Take 1 tablet by mouth Every Night.   • furosemide (LASIX) 40 MG tablet Take 1 tablet by mouth Daily.   • gabapentin (NEURONTIN) 600 MG tablet Take 1 tablet by mouth Every Night.   • omeprazole (priLOSEC) 40 MG capsule Take 1 capsule  by mouth Daily.   • tamsulosin (FLOMAX) 0.4 MG capsule 24 hr capsule Take 1 capsule by mouth Daily.   • telmisartan (MICARDIS) 80 MG tablet Take 1 tablet by mouth Daily.     No current facility-administered medications on file prior to visit.          Allergies:  No Known Allergies    Objective   Objective     Vitals:   Temp:  [98 °F (36.7 °C)] 98 °F (36.7 °C)  Heart Rate:  [62] 62  Resp:  [18] 18  BP: (154)/(80) 154/80    Physical Exam    Constitutional: Awake, alert, NAD   Neck: Supple   Respiratory: Clear to auscultation bilaterally, nonlabored respirations    Cardiovascular: RRR, no murmurs   Abdomen: S/NT/ND, + BS   Extremities: No c/c/slight edema to LLE with likely venous stasis dermatitis to the leg and L PT pulse palpable.      Result Review    Most notable findings include: LLE Venous negative for DVT on 4-11-23.    Assessment & Plan   Assessment / Plan       There are no diagnoses linked to this encounter.     Assessment & Plan:   Ermias Fuentes is a 67 y.o. male with left lower extremity cellulitis status post antibiotic treatment after admission 6 weeks ago.  2.  Now with evidence of swelling and erythema to the left calf but overall the leg has improved.  Presentation today looks more like evidence of venous stasis dermatitis.  3.  Patient has been elevating his left lower extremity and to continue to do this along with compression stocking of 20 to 30 mmHg knee-high to be worn 3 to 4 days throughout the week during the day and can be removed at nighttime.  4.  Patient does not recall any history of venous thromboembolism or clot.  He has a preserved and palpable left posterior tibial pulse and no evidence of any significant arterial disease.  5.  He was instructed that he can follow-up as needed and is to call for any significant worsening of the leg.        Electronically signed by Sher Beltran MD, 05/11/23, 9:50 AM EDT.

## 2023-06-09 ENCOUNTER — HOSPITAL ENCOUNTER (OUTPATIENT)
Dept: CARDIOLOGY | Facility: HOSPITAL | Age: 68
Discharge: HOME | End: 2023-06-09
Payer: MEDICARE

## 2023-06-09 DIAGNOSIS — I50.32 DIASTOLIC CHF, CHRONIC: ICD-10-CM

## 2023-06-09 PROCEDURE — 93306 TTE W/DOPPLER COMPLETE: CPT | Performed by: SPECIALIST

## 2023-06-09 PROCEDURE — 93306 TTE W/DOPPLER COMPLETE: CPT

## 2023-06-12 ENCOUNTER — TELEPHONE (OUTPATIENT)
Dept: CARDIOLOGY | Facility: CLINIC | Age: 68
End: 2023-06-12
Payer: MEDICARE

## 2023-06-12 LAB
BH CV ECHO MEAS - AO MAX PG: 20 MMHG
BH CV ECHO MEAS - AO MEAN PG: 10 MMHG
BH CV ECHO MEAS - AO ROOT DIAM: 2.7 CM
BH CV ECHO MEAS - AO V2 MAX: 221 CM/SEC
BH CV ECHO MEAS - AO V2 VTI: 42.7 CM
BH CV ECHO MEAS - AVA(I,D): 2.4 CM2
BH CV ECHO MEAS - EDV(CUBED): 64 ML
BH CV ECHO MEAS - EDV(MOD-SP2): 69.3 ML
BH CV ECHO MEAS - EDV(MOD-SP4): 70.7 ML
BH CV ECHO MEAS - EF(MOD-BP): 71.6 %
BH CV ECHO MEAS - EF(MOD-SP2): 67.4 %
BH CV ECHO MEAS - EF(MOD-SP4): 70 %
BH CV ECHO MEAS - ESV(CUBED): 13.8 ML
BH CV ECHO MEAS - ESV(MOD-SP2): 22.6 ML
BH CV ECHO MEAS - ESV(MOD-SP4): 21.2 ML
BH CV ECHO MEAS - FS: 40 %
BH CV ECHO MEAS - IVS/LVPW: 1.25 CM
BH CV ECHO MEAS - IVSD: 1.5 CM
BH CV ECHO MEAS - LA DIMENSION: 3.4 CM
BH CV ECHO MEAS - LAT PEAK E' VEL: 13.2 CM/SEC
BH CV ECHO MEAS - LV MASS(C)D: 197.6 GRAMS
BH CV ECHO MEAS - LV MAX PG: 9.2 MMHG
BH CV ECHO MEAS - LV MEAN PG: 5 MMHG
BH CV ECHO MEAS - LV V1 MAX: 152 CM/SEC
BH CV ECHO MEAS - LV V1 VTI: 29.6 CM
BH CV ECHO MEAS - LVIDD: 4 CM
BH CV ECHO MEAS - LVIDS: 2.4 CM
BH CV ECHO MEAS - LVOT AREA: 3.5 CM2
BH CV ECHO MEAS - LVOT DIAM: 2.1 CM
BH CV ECHO MEAS - LVPWD: 1.2 CM
BH CV ECHO MEAS - MED PEAK E' VEL: 7.9 CM/SEC
BH CV ECHO MEAS - MV A MAX VEL: 102 CM/SEC
BH CV ECHO MEAS - MV DEC SLOPE: 829 CM/SEC2
BH CV ECHO MEAS - MV DEC TIME: 0.22 MSEC
BH CV ECHO MEAS - MV E MAX VEL: 103 CM/SEC
BH CV ECHO MEAS - MV E/A: 1.01
BH CV ECHO MEAS - MV P1/2T: 39.2 MSEC
BH CV ECHO MEAS - MVA(P1/2T): 5.6 CM2
BH CV ECHO MEAS - RVDD: 2.6 CM
BH CV ECHO MEAS - SV(LVOT): 102.5 ML
BH CV ECHO MEAS - SV(MOD-SP2): 46.7 ML
BH CV ECHO MEAS - SV(MOD-SP4): 49.5 ML
BH CV ECHO MEASUREMENTS AVERAGE E/E' RATIO: 9.76
IVRT: 66 MSEC
LEFT ATRIUM VOLUME INDEX: 13.8 ML/M2
LEFT ATRIUM VOLUME: 31 ML
MAXIMAL PREDICTED HEART RATE: 152 BPM
STRESS TARGET HR: 129 BPM

## 2023-06-12 NOTE — TELEPHONE ENCOUNTER
----- Message from YOLY Georges sent at 6/12/2023 12:49 PM EDT -----  Notify pt echo result: Normal left ventricular systolic function with LVH. EF 71%  Mild aortic stenosis (murmur) will monitor with repeat echo in 2 years. Follow up as scheduled

## 2024-02-02 NOTE — PROGRESS NOTES
[facility]  Cardiology progress Note    Patient Name: Ermias Fuentes  : 1955    CHIEF COMPLAINT  CHF        Subjective   Subjective     HISTORY OF PRESENT ILLNESS    Ermias Fuentes is a 68 y.o. male with history of CHF and hypertension.  No palpitations.    REVIEW OF SYSTEMS    Constitutional:    No fever, no weight loss  Skin:     No rash  Otolaryngeal:    No difficulty swallowing  Cardiovascular: See HPI.  Pulmonary:    No cough, no sputum production    Personal History     Social History:    reports that he quit smoking about 23 years ago. His smoking use included cigarettes. He started smoking about 50 years ago. He has a 27.00 pack-year smoking history. He has never used smokeless tobacco. He reports current alcohol use of about 8.0 standard drinks of alcohol per week. He reports that he does not use drugs.    Home Medications:  Current Outpatient Medications on File Prior to Visit   Medication Sig    atorvastatin (LIPITOR) 20 MG tablet Take 1 tablet by mouth Every Night.    furosemide (LASIX) 40 MG tablet Take 1 tablet by mouth Daily.    gabapentin (NEURONTIN) 600 MG tablet Take 1 tablet by mouth Every Night.    meloxicam (MOBIC) 15 MG tablet     omeprazole (priLOSEC) 40 MG capsule Take 1 capsule by mouth Daily.    potassium chloride 10 MEQ CR tablet Take 1 tablet by mouth Daily.    tamsulosin (FLOMAX) 0.4 MG capsule 24 hr capsule Take 1 capsule by mouth Daily.    telmisartan (MICARDIS) 80 MG tablet Take 1 tablet by mouth Daily.     No current facility-administered medications on file prior to visit.       Past Medical History:   Diagnosis Date    BPH (benign prostatic hyperplasia)     Hyperlipidemia     Hypertension        Allergies:  No Known Allergies    Objective    Objective       Vitals:   Heart Rate:  [64] 64  BP: (133)/(67) 133/67  Body mass index is 32.78 kg/m².     PHYSICAL EXAM:    General Appearance:   well developed  well nourished  HENT:   oropharynx moist  lips not  cyanotic  Neck:  thyroid not enlarged  supple  Respiratory:  no respiratory distress  normal breath sounds  no rales  Cardiovascular:  no jugular venous distention  regular rhythm  apical impulse normal  S1 normal, S2 normal  no S3, no S4   no murmur  no rub, no thrill  carotid pulses normal; no bruit  pedal pulses normal  lower extremity edema: none    Skin:   warm, dry  Psychiatric:  judgement and insight appropriate  normal mood and affect        Result Review:  I have personally reviewed the available results from  [x]  Laboratory  [x]  EKG  [x]  Cardiology  [x]  Medications  [x]  Old records  []  Other:     Procedures  \  Results for orders placed during the hospital encounter of 06/09/23    Adult Transthoracic Echo Complete W/ Cont if Necessary Per Protocol    Interpretation Summary  Fibrocalcific aortic valve.  Normal left ventricular systolic function with left ventricular hypertrophy.  Mild aortic stenosis.     Impression/Plan:  1.  Mild aortic stenosis: Asymptomatic.  2.  Essential hypertension controlled: Continue Micardis 80 mg once a day.  Monitor blood pressure regularly.  3.  Chronic diastolic heart failure stable: Continue Lasix 40 mg once a day.  Continue potassium chloride 10 mEq once a day.  Monitor BMP.  4.  Hyperlipidemia: Continue Lipitor 20 mg once a day.  Monitor lipid and hepatic profile.           Senthil Montiel MD   02/05/24   13:48 EST

## 2024-02-05 ENCOUNTER — OFFICE VISIT (OUTPATIENT)
Dept: CARDIOLOGY | Facility: CLINIC | Age: 69
End: 2024-02-05
Payer: MEDICARE

## 2024-02-05 VITALS
HEIGHT: 71 IN | HEART RATE: 64 BPM | DIASTOLIC BLOOD PRESSURE: 67 MMHG | BODY MASS INDEX: 32.9 KG/M2 | WEIGHT: 235 LBS | SYSTOLIC BLOOD PRESSURE: 133 MMHG

## 2024-02-05 DIAGNOSIS — I50.32 DIASTOLIC CHF, CHRONIC: ICD-10-CM

## 2024-02-05 DIAGNOSIS — E78.2 HYPERLIPEMIA, MIXED: ICD-10-CM

## 2024-02-05 DIAGNOSIS — I10 HYPERTENSION, ESSENTIAL: Primary | ICD-10-CM

## 2024-02-05 PROCEDURE — 1160F RVW MEDS BY RX/DR IN RCRD: CPT | Performed by: SPECIALIST

## 2024-02-05 PROCEDURE — 99214 OFFICE O/P EST MOD 30 MIN: CPT | Performed by: SPECIALIST

## 2024-02-05 PROCEDURE — 1159F MED LIST DOCD IN RCRD: CPT | Performed by: SPECIALIST

## 2024-02-05 NOTE — PATIENT INSTRUCTIONS
"Low-Sodium Eating Plan  Sodium, which is an element that makes up salt, helps you maintain a healthy balance of fluids in your body. Too much sodium can increase your blood pressure and cause fluid and waste to be held in your body.  Your health care provider or dietitian may recommend following this plan if you have high blood pressure (hypertension), kidney disease, liver disease, or heart failure. Eating less sodium can help lower your blood pressure, reduce swelling, and protect your heart, liver, and kidneys.  What are tips for following this plan?  Reading food labels  The Nutrition Facts label lists the amount of sodium in one serving of the food. If you eat more than one serving, you must multiply the listed amount of sodium by the number of servings.  Choose foods with less than 140 mg of sodium per serving.  Avoid foods with 300 mg of sodium or more per serving.  Shopping    Look for lower-sodium products, often labeled as \"low-sodium\" or \"no salt added.\"  Always check the sodium content, even if foods are labeled as \"unsalted\" or \"no salt added.\"  Buy fresh foods.  Avoid canned foods and pre-made or frozen meals.  Avoid canned, cured, or processed meats.  Buy breads that have less than 80 mg of sodium per slice.  Cooking    Eat more home-cooked food and less restaurant, buffet, and fast food.  Avoid adding salt when cooking. Use salt-free seasonings or herbs instead of table salt or sea salt. Check with your health care provider or pharmacist before using salt substitutes.  Cook with plant-based oils, such as canola, sunflower, or olive oil.  Meal planning  When eating at a restaurant, ask that your food be prepared with less salt or no salt, if possible. Avoid dishes labeled as brined, pickled, cured, smoked, or made with soy sauce, miso, or teriyaki sauce.  Avoid foods that contain MSG (monosodium glutamate). MSG is sometimes added to Chinese food, bouillon, and some canned foods.  Make meals that can " "be grilled, baked, poached, roasted, or steamed. These are generally made with less sodium.  General information  Most people on this plan should limit their sodium intake to 1,500-2,000 mg (milligrams) of sodium each day.  What foods should I eat?  Fruits  Fresh, frozen, or canned fruit. Fruit juice.  Vegetables  Fresh or frozen vegetables. \"No salt added\" canned vegetables. \"No salt added\" tomato sauce and paste. Low-sodium or reduced-sodium tomato and vegetable juice.  Grains  Low-sodium cereals, including oats, puffed wheat and rice, and shredded wheat. Low-sodium crackers. Unsalted rice. Unsalted pasta. Low-sodium bread. Whole-grain breads and whole-grain pasta.  Meats and other proteins  Fresh or frozen (no salt added) meat, poultry, seafood, and fish. Low-sodium canned tuna and salmon. Unsalted nuts. Dried peas, beans, and lentils without added salt. Unsalted canned beans. Eggs. Unsalted nut butters.  Dairy  Milk. Soy milk. Cheese that is naturally low in sodium, such as ricotta cheese, fresh mozzarella, or Swiss cheese. Low-sodium or reduced-sodium cheese. Cream cheese. Yogurt.  Seasonings and condiments  Fresh and dried herbs and spices. Salt-free seasonings. Low-sodium mustard and ketchup. Sodium-free salad dressing. Sodium-free light mayonnaise. Fresh or refrigerated horseradish. Lemon juice. Vinegar.  Other foods  Homemade, reduced-sodium, or low-sodium soups. Unsalted popcorn and pretzels. Low-salt or salt-free chips.  The items listed above may not be a complete list of foods and beverages you can eat. Contact a dietitian for more information.  What foods should I avoid?  Vegetables  Sauerkraut, pickled vegetables, and relishes. Olives. French fries. Onion rings. Regular canned vegetables (not low-sodium or reduced-sodium). Regular canned tomato sauce and paste (not low-sodium or reduced-sodium). Regular tomato and vegetable juice (not low-sodium or reduced-sodium). Frozen vegetables in " sauces.  Grains  Instant hot cereals. Bread stuffing, pancake, and biscuit mixes. Croutons. Seasoned rice or pasta mixes. Noodle soup cups. Boxed or frozen macaroni and cheese. Regular salted crackers. Self-rising flour.  Meats and other proteins  Meat or fish that is salted, canned, smoked, spiced, or pickled. Precooked or cured meat, such as sausages or meat loaves. Tovar. Ham. Pepperoni. Hot dogs. Corned beef. Chipped beef. Salt pork. Jerky. Pickled herring. Anchovies and sardines. Regular canned tuna. Salted nuts.  Dairy  Processed cheese and cheese spreads. Hard cheeses. Cheese curds. Blue cheese. Feta cheese. String cheese. Regular cottage cheese. Buttermilk. Canned milk.  Fats and oils  Salted butter. Regular margarine. Ghee. Tovar fat.  Seasonings and condiments  Onion salt, garlic salt, seasoned salt, table salt, and sea salt. Canned and packaged gravies. Worcestershire sauce. Tartar sauce. Barbecue sauce. Teriyaki sauce. Soy sauce, including reduced-sodium. Steak sauce. Fish sauce. Oyster sauce. Cocktail sauce. Horseradish that you find on the shelf. Regular ketchup and mustard. Meat flavorings and tenderizers. Bouillon cubes. Hot sauce. Pre-made or packaged marinades. Pre-made or packaged taco seasonings. Relishes. Regular salad dressings. Salsa.  Other foods  Salted popcorn and pretzels. Corn chips and puffs. Potato and tortilla chips. Canned or dried soups. Pizza. Frozen entrees and pot pies.  The items listed above may not be a complete list of foods and beverages you should avoid. Contact a dietitian for more information.  Summary  Eating less sodium can help lower your blood pressure, reduce swelling, and protect your heart, liver, and kidneys.  Most people on this plan should limit their sodium intake to 1,500-2,000 mg (milligrams) of sodium each day.  Canned, boxed, and frozen foods are high in sodium. Restaurant foods, fast foods, and pizza are also very high in sodium. You also get sodium by  adding salt to food.  Try to cook at home, eat more fresh fruits and vegetables, and eat less fast food and canned, processed, or prepared foods.  This information is not intended to replace advice given to you by your health care provider. Make sure you discuss any questions you have with your health care provider.  Document Revised: 11/23/2020 Document Reviewed: 11/18/2020  [employer] Patient Education © 2023 [employer]

## 2024-07-23 ENCOUNTER — TELEPHONE (OUTPATIENT)
Dept: CARDIOLOGY | Facility: CLINIC | Age: 69
End: 2024-07-23
Payer: MEDICARE

## 2024-07-23 DIAGNOSIS — I50.32 DIASTOLIC CHF, CHRONIC: Primary | ICD-10-CM

## 2024-08-12 ENCOUNTER — LAB (OUTPATIENT)
Dept: LAB | Facility: HOSPITAL | Age: 69
End: 2024-08-12
Payer: MEDICARE

## 2024-08-12 DIAGNOSIS — I50.32 DIASTOLIC CHF, CHRONIC: ICD-10-CM

## 2024-08-12 LAB
ANION GAP SERPL CALCULATED.3IONS-SCNC: 10 MMOL/L (ref 5–15)
BUN SERPL-MCNC: 17 MG/DL (ref 8–23)
BUN/CREAT SERPL: 17 (ref 7–25)
CALCIUM SPEC-SCNC: 9.9 MG/DL (ref 8.6–10.5)
CHLORIDE SERPL-SCNC: 100 MMOL/L (ref 98–107)
CO2 SERPL-SCNC: 28 MMOL/L (ref 22–29)
CREAT SERPL-MCNC: 1 MG/DL (ref 0.76–1.27)
EGFRCR SERPLBLD CKD-EPI 2021: 81.5 ML/MIN/1.73
GLUCOSE SERPL-MCNC: 132 MG/DL (ref 65–99)
NT-PROBNP SERPL-MCNC: <36 PG/ML (ref 0–900)
POTASSIUM SERPL-SCNC: 4.2 MMOL/L (ref 3.5–5.2)
SODIUM SERPL-SCNC: 138 MMOL/L (ref 136–145)

## 2024-08-12 PROCEDURE — 80048 BASIC METABOLIC PNL TOTAL CA: CPT

## 2024-08-12 PROCEDURE — 83880 ASSAY OF NATRIURETIC PEPTIDE: CPT

## 2024-08-12 PROCEDURE — 36415 COLL VENOUS BLD VENIPUNCTURE: CPT

## 2024-08-15 NOTE — PROGRESS NOTES
[facility]  Cardiology progress Note    Patient Name: Ermias Fuentes  : 1955    CHIEF COMPLAINT  CHF        Subjective   Subjective     HISTORY OF PRESENT ILLNESS    Ermias Fuentes is a 68 y.o. male with history of CHF and mild aortic stenosis.  No shortness of breath.    REVIEW OF SYSTEMS    Constitutional:    No fever, no weight loss  Skin:     No rash  Otolaryngeal:    No difficulty swallowing  Cardiovascular: See HPI.  Pulmonary:    No cough, no sputum production    Personal History     Social History:    reports that he quit smoking about 24 years ago. His smoking use included cigarettes. He started smoking about 51 years ago. He has a 27 pack-year smoking history. He has never used smokeless tobacco. He reports current alcohol use of about 8.0 standard drinks of alcohol per week. He reports that he does not use drugs.    Home Medications:  Current Outpatient Medications on File Prior to Visit   Medication Sig    amLODIPine (NORVASC) 10 MG tablet Take 1 tablet by mouth Daily.    atorvastatin (LIPITOR) 20 MG tablet Take 1 tablet by mouth Every Night.    furosemide (LASIX) 40 MG tablet Take 1 tablet by mouth Daily.    gabapentin (NEURONTIN) 600 MG tablet Take 1 tablet by mouth Every Night.    hydrALAZINE (APRESOLINE) 25 MG tablet 2 (Two) Times a Day.    meloxicam (MOBIC) 15 MG tablet     omeprazole (priLOSEC) 40 MG capsule Take 1 capsule by mouth Daily.    potassium chloride 10 MEQ CR tablet Take 1 tablet by mouth Daily. (Patient taking differently: Take 1 tablet by mouth Daily. HAS BEEN OUT OF POTASSIUM FOR A MONTH)    tamsulosin (FLOMAX) 0.4 MG capsule 24 hr capsule Take 1 capsule by mouth Daily.    telmisartan (MICARDIS) 80 MG tablet Take 1 tablet by mouth Daily.     No current facility-administered medications on file prior to visit.       Past Medical History:   Diagnosis Date    BPH (benign prostatic hyperplasia)     Hyperlipidemia     Hypertension        Allergies:  No Known  Allergies    Objective    Objective       Vitals:   Heart Rate:  [72] 72  BP: (138)/(64) 138/64  Body mass index is 33.33 kg/m².     PHYSICAL EXAM:    General Appearance:   well developed  well nourished  HENT:   oropharynx moist  lips not cyanotic  Neck:  thyroid not enlarged  supple  Respiratory:  no respiratory distress  normal breath sounds  no rales  Cardiovascular:  no jugular venous distention  regular rhythm  apical impulse normal  S1 normal, S2 normal  no S3, no S4   SM gr 3/6 AA  no rub, no thrill  carotid pulses normal; no bruit  pedal pulses normal  lower extremity edema: none    Skin:   warm, dry  Psychiatric:  judgement and insight appropriate  normal mood and affect        Result Review:  I have personally reviewed the available results from  [x]  Laboratory  [x]  EKG  [x]  Cardiology  [x]  Medications  [x]  Old records  []  Other:     Procedures    Results for orders placed during the hospital encounter of 06/09/23    Adult Transthoracic Echo Complete W/ Cont if Necessary Per Protocol    Interpretation Summary  Fibrocalcific aortic valve.  Normal left ventricular systolic function with left ventricular hypertrophy.  Mild aortic stenosis.     Impression/Plan:  1.  Mild aortic stenosis: Asymptomatic.  2.  Essential hypertension controlled: Continue Micardis 80 mg once a day.  Continue amlodipine 10 mg once a day.  Monitor blood pressure regularly.  3.  Chronic diastolic heart failure stable: Continue Lasix 40 mg once a day.  Continue potassium chloride 10 mEq once a day.  Monitor BMP.  4.  Mixed hyperlipidemia: Continue Lipitor 20 mg once a day.  Monitor lipid and hepatic profile.               Senthil Montiel MD   08/19/24   13:07 EDT

## 2024-08-19 ENCOUNTER — OFFICE VISIT (OUTPATIENT)
Dept: CARDIOLOGY | Facility: CLINIC | Age: 69
End: 2024-08-19
Payer: MEDICARE

## 2024-08-19 VITALS
SYSTOLIC BLOOD PRESSURE: 138 MMHG | DIASTOLIC BLOOD PRESSURE: 64 MMHG | BODY MASS INDEX: 33.46 KG/M2 | HEART RATE: 72 BPM | WEIGHT: 239 LBS | HEIGHT: 71 IN

## 2024-08-19 DIAGNOSIS — I50.32 DIASTOLIC CHF, CHRONIC: ICD-10-CM

## 2024-08-19 DIAGNOSIS — I10 HYPERTENSION, ESSENTIAL: Primary | ICD-10-CM

## 2024-08-19 DIAGNOSIS — E78.2 HYPERLIPEMIA, MIXED: ICD-10-CM

## 2024-08-19 PROCEDURE — 1159F MED LIST DOCD IN RCRD: CPT | Performed by: SPECIALIST

## 2024-08-19 PROCEDURE — 1160F RVW MEDS BY RX/DR IN RCRD: CPT | Performed by: SPECIALIST

## 2024-08-19 PROCEDURE — 99214 OFFICE O/P EST MOD 30 MIN: CPT | Performed by: SPECIALIST

## 2024-08-19 RX ORDER — HYDRALAZINE HYDROCHLORIDE 25 MG/1
TABLET, FILM COATED ORAL 2 TIMES DAILY
COMMUNITY
Start: 2023-10-17

## 2024-08-19 RX ORDER — AMLODIPINE BESYLATE 10 MG/1
10 TABLET ORAL DAILY
COMMUNITY
Start: 2024-07-18

## 2025-04-16 NOTE — PROGRESS NOTES
[facility]  Cardiology progress Note    Patient Name: Ermias Fuentes  : 1955    CHIEF COMPLAINT  Hypertension        Subjective   Subjective     HISTORY OF PRESENT ILLNESS    Ermias Fuentes is a 69 y.o. male with hypertension and CHF.  No chest pain    REVIEW OF SYSTEMS    Constitutional:    No fever, no weight loss  Skin:     No rash  Otolaryngeal:    No difficulty swallowing  Cardiovascular: See HPI.  Pulmonary:    No cough, no sputum production    Personal History     Social History:    reports that he quit smoking about 25 years ago. His smoking use included cigarettes. He started smoking about 52 years ago. He has a 27 pack-year smoking history. He has never used smokeless tobacco. He reports current alcohol use of about 8.0 standard drinks of alcohol per week. He reports that he does not use drugs.    Home Medications:  Current Outpatient Medications on File Prior to Visit   Medication Sig    amLODIPine (NORVASC) 10 MG tablet Take 1 tablet by mouth Daily.    atorvastatin (LIPITOR) 20 MG tablet Take 1 tablet by mouth Every Night.    ferrous sulfate 324 (65 Fe) MG tablet delayed-release EC tablet     furosemide (LASIX) 40 MG tablet Take 1 tablet by mouth Daily.    gabapentin (NEURONTIN) 600 MG tablet Take 1 tablet by mouth Every Night.    hydrALAZINE (APRESOLINE) 25 MG tablet 2 (Two) Times a Day.    meloxicam (MOBIC) 15 MG tablet     omeprazole (priLOSEC) 40 MG capsule Take 1 capsule by mouth Daily.    potassium chloride 10 MEQ CR tablet Take 1 tablet by mouth Daily.    tamsulosin (FLOMAX) 0.4 MG capsule 24 hr capsule Take 1 capsule by mouth Daily.    telmisartan (MICARDIS) 80 MG tablet Take 1 tablet by mouth Daily.    traMADol (ULTRAM) 50 MG tablet Take 1 tablet twice a day by oral route as needed for 30 days.     No current facility-administered medications on file prior to visit.       Past Medical History:   Diagnosis Date    BPH (benign prostatic hyperplasia)     Hyperlipidemia      Hypertension        Allergies:  No Known Allergies    Objective    Objective       Vitals:   Heart Rate:  [65-66] 65  BP: (126-145)/(48-56) 126/48  Body mass index is 32.92 kg/m².     PHYSICAL EXAM:    General Appearance:   well developed  well nourished  HENT:   oropharynx moist  lips not cyanotic  Neck:  thyroid not enlarged  supple  Respiratory:  no respiratory distress  normal breath sounds  no rales  Cardiovascular:  no jugular venous distention  regular rhythm  apical impulse normal  S1 normal, S2 normal  no S3, no S4   no murmur  no rub, no thrill  carotid pulses normal; no bruit  pedal pulses normal  lower extremity edema: none    Skin:   warm, dry  Psychiatric:  judgement and insight appropriate  normal mood and affect        Result Review:  I have personally reviewed the available results from  [x]  Laboratory  [x]  EKG  [x]  Cardiology  [x]  Medications  [x]  Old records  []  Other:     Procedures    Results for orders placed during the hospital encounter of 06/09/23    Adult Transthoracic Echo Complete W/ Cont if Necessary Per Protocol    Interpretation Summary  Fibrocalcific aortic valve.  Normal left ventricular systolic function with left ventricular hypertrophy.  Mild aortic stenosis.     Impression/Plan:  1.  Mild aortic stenosis: Asymptomatic.  2.  Essential hypertension controlled: Continue Micardis 80 mg once a day.  Continue amlodipine 10 mg once a day.  Monitor blood pressure regularly.  3.  Chronic diastolic heart failure stable: Continue Lasix 40 mg once a day.  Continue potassium chloride 10 mEq once a day.  Monitor BMP.  4.  Mixed hyperlipidemia: Continue Lipitor 20 mg once a day.  Monitor lipid and hepatic profile.               Senthil Montiel MD   04/21/25   13:26 EDT

## 2025-04-21 ENCOUNTER — OFFICE VISIT (OUTPATIENT)
Dept: CARDIOLOGY | Facility: CLINIC | Age: 70
End: 2025-04-21
Payer: MEDICARE

## 2025-04-21 VITALS
SYSTOLIC BLOOD PRESSURE: 126 MMHG | HEART RATE: 65 BPM | HEIGHT: 71 IN | DIASTOLIC BLOOD PRESSURE: 48 MMHG | WEIGHT: 236 LBS | BODY MASS INDEX: 33.04 KG/M2

## 2025-04-21 DIAGNOSIS — I50.32 DIASTOLIC CHF, CHRONIC: ICD-10-CM

## 2025-04-21 DIAGNOSIS — I10 HYPERTENSION, ESSENTIAL: Primary | ICD-10-CM

## 2025-04-21 DIAGNOSIS — E78.2 HYPERLIPEMIA, MIXED: ICD-10-CM

## 2025-04-21 PROCEDURE — 1159F MED LIST DOCD IN RCRD: CPT | Performed by: SPECIALIST

## 2025-04-21 PROCEDURE — 99214 OFFICE O/P EST MOD 30 MIN: CPT | Performed by: SPECIALIST

## 2025-04-21 PROCEDURE — 1160F RVW MEDS BY RX/DR IN RCRD: CPT | Performed by: SPECIALIST

## 2025-04-21 RX ORDER — TRAMADOL HYDROCHLORIDE 50 MG/1
TABLET ORAL
COMMUNITY

## 2025-04-21 RX ORDER — FERROUS SULFATE 324(65)MG
TABLET, DELAYED RELEASE (ENTERIC COATED) ORAL
COMMUNITY
Start: 2025-02-25